# Patient Record
Sex: FEMALE | Race: OTHER | Employment: UNEMPLOYED | URBAN - METROPOLITAN AREA
[De-identification: names, ages, dates, MRNs, and addresses within clinical notes are randomized per-mention and may not be internally consistent; named-entity substitution may affect disease eponyms.]

---

## 2017-05-02 NOTE — PATIENT INSTRUCTIONS
Healthy Diet and Regular Exercise  The [facility] of [address] [place], [place] [address]TRIRIG[employer] for making healthy food choices  -   Enjoy your food, but eat less. Fully enjoy your food when eating. Don’t eat while distracted and slow down. Avoid over sized portions.    Don’

## 2017-05-02 NOTE — PROGRESS NOTES
Physical    Patient's past medical surgical family social history was reviewed. Review of Systems  Allergic: no environmental allergies or food allergies  Cardiovascular: no chest pain, irregular heartbeat, or palpitations.   Constitutional: no fever or

## 2017-06-23 NOTE — PROGRESS NOTES
Patient states that she feels weakness headaches with taking multivitamins after taking them for about anywhere from 5-8 days. She had it when she was pregnant prenatal vitamins she has had recently when she tried taking over-the-counter vitamins.   She al

## 2018-04-23 NOTE — TELEPHONE ENCOUNTER
Action Requested: Summary for Provider     []  Critical Lab, Recommendations Needed  [] Need Additional Advice  []   FYI    []   Need Orders  [] Need Medications Sent to Pharmacy  []  Other      #[id number]    SUMMARY: Patient requesting appt for c/

## 2018-04-24 PROBLEM — J35.8 CRYPTIC TONSIL: Status: ACTIVE | Noted: 2018-04-24

## 2018-04-24 NOTE — PROGRESS NOTES
Patient ID: Raissa Escamilla is a 38year old female.     HPI  Patient presents with:  Sore Throat: X 4 days    Action Requested: Summary for Provider     []  Critical Lab, Recommendations Needed  [] Need Additional Advice  []   FYI    []   Need Orders  [] that is not healing. She states it is sometimes itchy and she picks at it. She cannot remember what it started.   Wt Readings from Last 6 Encounters:  04/24/18 : 160 lb (72.6 kg)  06/23/17 : 150 lb (68 kg)  05/02/17 : 150 lb 6.4 oz (68.2 kg)  12/08/16 : 1 tonsillar at this time. Eyes: Conjunctivae and EOM are normal.   Neck: Neck supple. No thyromegaly present. Cardiovascular: Normal rate, regular rhythm and normal heart sounds.     Pulmonary/Chest: Effort normal and breath sounds normal. No respiratory d DO  4/24/2018

## 2018-04-24 NOTE — PATIENT INSTRUCTIONS
TONSIL STONES (TONSILLITH)    Go ahead and mix half water with half hydrogen peroxide and gargle and spit 2-3 times per week. This will help remove the little tonsil stones that get caught in your tonsils.

## 2018-06-23 NOTE — TELEPHONE ENCOUNTER
Refill Protocol Appointment Criteria  · Appointment scheduled in the past 6 months or in the next 3 months  Recent Outpatient Visits            2 months ago Cryptic tonsil    [address], [address], [place], [place]    Office Visit    1 dutch

## 2018-07-16 NOTE — PROGRESS NOTES
7/16/2018  11:08 AM    Vamsi Brink is a 38year old female. Chief complaint(s): Patient presents with:  Back Pain: pt c/o mid region back pain X 1 month off and on     HPI:     Vamsi Brink primary complaint is regarding back pain.      Marca Goltz Oral Tab Take 1 tablet (550 mg total) by mouth 2 (two) times daily with meals.  Disp: 60 tablet Rfl: 1   Butalbital-APAP-Caffeine -40 MG Oral Tab TAKE 1 TABLET EVERY 4 HOURS AS NEEDED Disp: 30 tablet Rfl: 0       Allergies:  No Known Allergies      RO Prescriptions Disp Refills    Metaxalone (SKELAXIN) 800 MG Oral Tab 30 tablet 1      Sig: Take 1 tablet (800 mg total) by mouth 3 (three) times daily.       Naproxen Sodium (ANAPROX DS) 550 MG Oral Tab 60 tablet 1      Sig: Take 1 tablet (550 mg total) by m

## 2018-08-22 NOTE — PROGRESS NOTES
HPI:    Patient ID: Kristofer Pena is a 38year old female. Patient presents for pain in right hand radiating to right elbow for past few weeks.   Patient has history of injury of right hand and wrist at work about one year ago and had steroid injection strength right hand. Neurological: She is alert and oriented to person, place, and time. No cranial nerve deficit. Coordination normal.   Skin: Skin is warm and dry. No erythema. Psychiatric: She has a normal mood and affect. Vitals reviewed.

## 2018-09-06 NOTE — TELEPHONE ENCOUNTER
Pt called to follow up on message below. Pt asking again for doctor's recommendations for urinary symptoms. Informed pt doctor does not have any appt available today. Pt asking if doctor can prescribe something for her symptoms.      Above information obtai

## 2018-09-06 NOTE — TELEPHONE ENCOUNTER
LMTCB. May transfer to Triage. Noted Aia 16 sent antibiotic to [place]:   Sulfamethoxazole-TMP -160 MG Oral Tab per tablet 10 tablet 0 9/6/2018     Sig - Route:  Take 1 tablet by mouth 2 (two) times daily. - Oral    E-Prescribing Status: Receipt confi

## 2018-09-06 NOTE — TELEPHONE ENCOUNTER
Called with the assistance of language line solutions (#). [phone]   Advised patient that rx was sent to pharmacy, complete entire rx, push fluids, patient agreed to plan

## 2018-09-06 NOTE — TELEPHONE ENCOUNTER
Action Requested: Summary for Provider     []  Critical Lab, Recommendations Needed  [] Need Additional Advice  []   FYI    []   Need Orders  [] Need Medications Sent to Pharmacy  []  Other     SUMMARY:  Pt seeking recommendations for urinary symptoms.

## 2018-10-03 PROBLEM — Z12.31 SCREENING MAMMOGRAM, ENCOUNTER FOR: Status: ACTIVE | Noted: 2018-10-03

## 2018-10-03 NOTE — TELEPHONE ENCOUNTER
----- Message from Noel Obregon MD sent at 10/3/2018 11:28 AM CDT -----  Please inform UA is borderline. Not clearly confirming uti, however, the patient has sx of dysuria.   She can either push fluid/water, take cranberry juice/tablets and await C and

## 2018-10-03 NOTE — PROGRESS NOTES
HPI:    Patient ID: Isatu Cox is a 38year old female. HPI  [facility] visit  C/o recurrent UTI. Past year had 5-6 \"infections\". Received treatment. Several weeks ago, was treated. Felt still some \"pressure\" for 2 weeks afterward.   The last is warm without pallor. Psychiatric: Her behavior is normal. Judgment normal.  Able to communicate verbally. HEENT:  EOMI. JOSEPH. Sclera anicteric. Head: Normocephalic. Normal hair distribution. No lesions. Neck: Normal range of motion.       Ad lesions  Anus-  Normal appearing without lesions. ASSESSMENT/PLAN:   Women's annual routine gynecological examination  (primary encounter diagnosis)  Screening mammogram, encounter for  Pain with urination  Check u/a and c and s.     Normal exam.  Pap

## 2018-10-03 NOTE — TELEPHONE ENCOUNTER
Notes recorded by Charlotte Dutta MD on 10/3/2018 at 2:17 PM CDT  Please inform we just received urine C and S and it was negative.  No UTI.  No need for antibx    Informed pt of Dr. Ena Coronel message above.  Pt upset because she is still having symptoms of

## 2018-10-03 NOTE — TELEPHONE ENCOUNTER
Pt needs a note for work regarding UTI .   States she needs proof from the dr that she has UTI  Bahamian speaker

## 2018-10-04 NOTE — TELEPHONE ENCOUNTER
Pt informed and verbalized understanding. Provided Dr. Julieth Swann  Phone: [phone]. Rx sent to pharmacy on file.

## 2018-10-04 NOTE — TELEPHONE ENCOUNTER
Called pt and no answer. Message left on VM. If pt calls, you may instruct that her tests do not confirm a UTI. Her dysuria may be due to another bladder or urethral issue. We can order her Pyridium 200 mg PO TID prn dysuria #10, gen OK.   She can see a

## 2018-10-05 NOTE — PROGRESS NOTES
REASON FOR VISIT:    Kajal Villa is a 38year old female who presents for an [address].     Fatigue x 1 year  Pos heair loss  No depression   No excessive snoring  No menstral irregularities      Patient Active Problem List:     Female pe MONITORING Internal Lab or Procedure   No disease specific diagnoses    ALLERGIES:   No Known Allergies  CURRENT MEDICATIONS:     Current Outpatient Medications:  Ciprofloxacin HCl 500 MG Oral Tab Take 1 tablet (500 mg total) by mouth 2 (two) times daily. recorded. GENERAL: well developed, well nourished, in no apparent distress  SKIN: no rashes, no suspicious lesions  HEENT: atraumatic, normocephalic, ears and throat are clear  EYES:PERRLA, EOMI, conjunctiva are clear.  normal optic disc  NECK: supple, n on 09/01/2018  Pneumonia: There are no preventive care reminders to display for this patient. HPV: There are no preventive care reminders to display for this patient. Tdap: There are no preventive care reminders to display for this patient.   Shingles:

## 2018-11-21 NOTE — TELEPHONE ENCOUNTER
Pt requesting rx for migraine h/a-started yesterday ,taking tylenol-no relief ,unable to come for Appt  LOV 10/5/18, LR 7/16/18

## 2018-11-21 NOTE — TELEPHONE ENCOUNTER
Pt is requesting a refill on :      Current Outpatient Medications:  Butalbital-APAP-Caffeine -40 MG Oral Tab TAKE 1 TABLET EVERY 4 HOURS AS NEEDED Disp: 30 tablet Rfl: 0

## 2018-11-21 NOTE — TELEPHONE ENCOUNTER
Called in to [address] at [facility]. Pt informed.

## 2018-11-21 NOTE — TELEPHONE ENCOUNTER
Pt stated that she wants to know what medication did we send over. I told her it was Butalbital that was called in. She does not believe this is what she has been on.  I inform her yes if she is in doubt she can ask her pharmacist. She will call us back if

## 2018-12-07 NOTE — MR AVS SNAPSHOT
After Visit Summary   3/28/2024    Alessandra Smith   MRN: [id number]           Visit Information     Date & Time  3/28/2024  1:40 PM Provider  Al Fleming MD [facility] - OB/GYN Dept. Phone  [phone]      Your Vitals Were  Most recent update: 3/28/2024  1:32 PM    BP   128/79 (BP Location: Right arm, Patient Position: Sitting, Cuff Size: large)          Pulse   69          Ht   62\"          Wt   141 lb          LMP   03/14/2024 (Approximate)             BMI   25.79 kg/m²         Allergies as of 3/28/2024  Review status set to Review Complete on 3/28/2024   No Known Allergies     Your Current Medications        Dosage    acetaminophen-codeine 300-30 MG Oral Tab TAKE 1-2 TABLETS BY MOUTH EVERY 4-6 HOURS AS NEEDED FOR SEVERE PAIN    neomycin-polymyxin-dexamethasone 3.5-38471-0.1 Ophthalmic Ointment APLICAR EN AMBOS OJOS AL ACOSTARSE    ofloxacin 0.3 % Ophthalmic Solution INSTILAR 1 GOTA EN EL AMELIA QUIRURGICO CUATRO VECES AL KVNG    Cholecalciferol (VITAMIN D) 50 MCG (2000 UT) Oral Cap Take 2.5 capsules (5,000 Units total) by mouth.    LORazepam (ATIVAN) 0.5 MG Oral Tab Take 1 tablet (0.5 mg total) by mouth 2 (two) times daily as needed for Anxiety.    thyroid (NP THYROID) 30 MG Oral Tab Take 1 tablet (30 mg total) by mouth daily.    ALBUTEROL 108 (90 Base) MCG/ACT Inhalation Aero Soln Inhale 2 puffs into the lungs every 4 (four) hours as needed for Wheezing.    prednisoLONE 1 % Ophthalmic Suspension     guaiFENesin-codeine (CHERATUSSIN AC) 100-10 MG/5ML Oral Solution Take 5 mL by mouth every 6 (six) hours as needed for cough.    Finerenone (KERENDIA) 10 MG Oral Tab Take 10 mg by mouth daily.    JARDIANCE 25 MG Oral Tab TOME 1 TABLETA DIARIAMENTE    Ruxolitinib Phosphate (OPZELURA) 1.5 % External Cream Apply 1 Application topically in the morning and 1 Application before bedtime. For non-segmental vitiligo, unresponsive to topical steroids    [facility]   [address] [place] [address]         Mr. SONAL ARNOLD       Our records show that you are overdue for pacemaker/defibrillator device check. To ensure proper and efficient function of these devices, devices should be checked every 3 months.     Please call our office at [phone] to schedule your device appointment.     Sincerely,     Signatures   Electronically signed by : Umu Castaneda, ; Nov 20 2018  2:47PM CST     previously use on white patches.    clobetasol 0.05 % External Cream Apply 1 Application topically 2 (two) times daily. To white patches. Do not use on the face.    spironolactone 25 MG Oral Tab Take 1 tablet (25 mg total) by mouth daily.    Blood Glucose Monitoring Suppl (CONTOUR NEXT ONE) Does not apply Device CHECK GLUCOSE ONE TIME A DAY AS DIRECTED    butalbital-acetaminophen-caffeine -40 MG Oral Tab TAKE 1 TABLET EVERY 4 HOURS AS NEEDED    ciprofloxacin 500 MG Oral Tab Take 1 tablet (500 mg total) by mouth 2 (two) times daily.    Glucose Blood (CONTOUR NEXT TEST) In Vitro Strip Check glucose one time a day. DX R73.9    Blood Glucose Monitoring Suppl (CONTOUR NEXT MONITOR) w/Device Does not apply Kit 1 Device As Directed. Check glucose one time a day    OneTouch Delica Lancets 33G Does not apply Misc 1 lancet by Finger stick route 2 (two) times a day. DX: R73.9    Blood Glucose Monitoring Suppl (ONETOUCH ULTRA MINI) w/Device Does not apply Kit 1 Device by Other route As Directed. Use as directed. DX: R73.9      Diagnoses for This Visit    Screening for cervical cancer   [097182]  -  Primary  Women's annual routine gynecological examination   [3674163]    Screening mammogram for breast cancer   [8058284]             We Ordered the Following     Normal Orders This Visit    Hpv Dna  High Risk , Thin Prep Collect [TFG6609 CUSTOM]     THINPREP PAP SMEAR ONLY [[id number] CUSTOM]     ThinPrep PAP Smear [OYF8030 CUSTOM]     Future Labs/Procedures Expected by Expires    Hpv Dna  High Risk , Thin Prep Collect [[id number] CUSTOM]  3/28/2024 3/28/2025    [facility] DAVID 2D+3D SCREENING BILAT (CPT=77067/91042) [COMBO CPT(R)]  3/28/2024 (Approximate) 3/28/2025    ThinPrep PAP Smear [[id number] CUSTOM]  3/28/2024 3/28/2025      Future Appointments        Provider Department    4/22/2024 1:40 PM Akshat Estrada    5/15/2024 11:40 AM [address]    3/31/2025 10:00 AM  Al Fleming [facility] - OB/GYN      Imaging Scheduling Instructions     Around March 28, 2024   Imaging:   PAULO DAVID 2D+3D SCREENING BILAT (CPT=77067/52205)    Instructions: To schedule a test at any [facility], call Central Scheduling at [phone], Monday through Friday between 7:30am to 6pm and on Saturday between 8am and 1pm.      [facility] ([address])        [address]    [place], [place]          It is the patient's responsibility to check with and follow their insurance company's guidelines for prior authorization for this test.  You may be held responsible for payment in full if proper authorization is not acquired.  Please contact the Patient Business Office at [phone] if you have   any questions related to insurance coverage.  Thank you.    Children: Children under the age of 12 must have another adult caregiver with them. Please do not bring your child/children without a caregiver. Because of the highly sensitive equipment and privacy of all our patients, children will not be permitted in the exam rooms, unless otherwise noted and in   accordance with departmental policy.                  Did you know that [facility] primary care physicians now offer Video Visits through [employer] for adult patients for a variety of conditions such as allergies, back pain and cold symptoms? Skip the drive and waiting room and online chat with a doctor face-to-face using your web-cam enabled computer or mobile device wherever you are. Video Visits cost $50 and can be paid hassle-free using a credit, debit, or health savings card.  Not active on [employer]? Ask us how to get signed up today!          If you receive a survey from Ludivina Flores, please take a few minutes to complete it and provide feedback. We strive to deliver the best patient experience and are looking for ways to make improvements. Your feedback will  help us do so. For more information on [employer], please visit [email or web address]           No text in SmartText           No text in SmartText

## 2018-12-07 NOTE — PATIENT INSTRUCTIONS
Pratik Moseley M.D.    1.  Urine specimen today for urine culture and complete urinalysis    2.   Standing order for urine culture and complete urinalysis anytime you think you have an infection--go to [facility]

## 2018-12-07 NOTE — PROGRESS NOTES
Tay Armijo is a 37year old female. Reason for Consultation:   Patient presents with:  UTI: PT referred by [facility] and  for recurrent UTI.  PT denies current symptoms, states when she does have infection she has burning and pain Past Surgical History:   Procedure Laterality Date   • BREAST SURGERY Right 16 yrs ago    right breasst cyst   • CHOLECYSTECTOMY     • TUBAL LIGATION        Family History   Problem Relation Age of Onset   • Diabetes Father    • Heart Disorder Mother symptoms  Psychiatric:  Negative for inappropriate interaction and psychiatric symptoms  Respiratory:  Negative for cough, dyspnea and wheezing      PHYSICAL EXAM:   Constitutional: appears well hydrated alert and responsive no acute distress noted  Neurol cipro 500 mg 2x for 5 days    Provided pt instructions to immediately return to laboratory for UA if pt experiences another infection.     Imagin Renal US = Normal renal ultrasound          ASSESSMENT/PLAN:      (N39.0) Recurrent UTI  (primar this test is to make sure that there is nothing wrong with the anatomy of the bladder that might cause you to have more frequent urinary tract infections. Please make sure CT scan is completed before the cystoscopy     5.   Please take diazepam/Valium 5

## 2018-12-20 NOTE — TELEPHONE ENCOUNTER
I called pt with the assistance of Bermudian interpretor Toan Cueto [place] # [id number] and I informed pt that we are trying to schd her cysto poss. Bladder bx with PVK at the [facility] under local but she has not yet completed the CT scan.  I told her that it will need PA wi

## 2018-12-20 NOTE — TELEPHONE ENCOUNTER
I spoke with pt with the assistance of marivel Arias Right ID # [id number] and told pt that we are wanting to move forward with schdg her cysto poss bladder BX at the [facility] under local, but she has not completed her CT scan yet.  I told pt that since this

## 2018-12-21 NOTE — TELEPHONE ENCOUNTER
Pt procedure/Testing: CT abd/pelvis without contrast.  Pt insurance/number to contact: [employer] ID# and group: [id number], [id number]  Dx: recurrent UTI's-N39.0  CPT: 93997  Procedure scheduled date:   Procedure scheduled where:  Pt author

## 2018-12-26 NOTE — TELEPHONE ENCOUNTER
See below. Case NOT approved. Per automated system, letter of denial sent to pt,as well and a PEER to PEER review may be initiated, if you disagree with decision. , please let me know if you wish me to arrange this for you. Thank you.

## 2018-12-27 NOTE — TELEPHONE ENCOUNTER
Pt procedure/Testing US kidneys    Pt insurance/number to contact:[phone]  Insurance ID# and AFNOO:[id number]  Dx:  ALR:[id number]  Indication for test:  Procedure scheduled date:  Procedure scheduled where:  Pt authorization number:NO PRIOR AUTH REQUIRED P

## 2018-12-27 NOTE — TELEPHONE ENCOUNTER
Phoned pt using language line,  Sahil cheung, [place] [id number]. Read to her 's instructions in note as outlined below in this encounter. She verbalized understanding. Informed her staff will need to  attempt to get u.s.  Authorized, and she should call cl

## 2018-12-27 NOTE — TELEPHONE ENCOUNTER
Called patient and notified her that u/s does not require prior auth patient verbalized understanding

## 2018-12-27 NOTE — TELEPHONE ENCOUNTER
Please contact patient and notify ---  Her health insurance plan is denying covering CT scan for workup of recurrent urinary tract infection.   Another medically acceptable approach, for investigating urinary tract infections, would be a kidney ultrasound p

## 2019-01-15 NOTE — TELEPHONE ENCOUNTER
Pt states she has new ins, states she is ready to go forward with treatment PVK recommended, asking what the next step would be. Pls call thank you. Kyrgyz speaker.

## 2019-01-16 NOTE — TELEPHONE ENCOUNTER
Surgery schdg: please see bolded msg below. You may already have this surgery request as pt had her O/V in late Dec. 2018 but did not complete the testing due to her INS expiring and she now has a new INS.      I called pt with the assistance of marivel int

## 2019-01-16 NOTE — TELEPHONE ENCOUNTER
I called pt with the assistance of Cypriot interpretor Briseida Fan # [id number] and I reviewed all of the instructions in PVK's AVS of LOV in Dec 2018 and I also told pt that at that time we call her INS regarding the Kidney US and when we called her to notify t

## 2019-01-17 NOTE — TELEPHONE ENCOUNTER
Pt procedure/Testing: US Kidneys  Pt insurance/number to contact: [phone]  Insurance ID# and group: [id number]  Dx: N13.0, n20.0  CPT: 40349  Indication for test: Hydronephrosis  Procedure scheduled date:  Procedure scheduled where:  Pt authorization

## 2019-01-17 NOTE — TELEPHONE ENCOUNTER
See below. Phoned pt via language line,  vikas, id # [id number]. Informed pt that [facility] is out of network with her new [employer], however the hospital is not out of network.  Pt does not want to proceed any further with testing o

## 2019-01-17 NOTE — TELEPHONE ENCOUNTER
Pt was called today about the PA that was started for the CT scan and was told that [facility] [place] [place] is now out of network for her new [employer] INS. Pt will not be going forward with the testing and cysto at the [address], and will find a new provider.

## 2019-03-11 NOTE — PROGRESS NOTES
Had for 1 moth +  Worse over last week  Hurts to sit   Rt lower thoracic overib  \"I feel a bump there\"    No fall  No cough  No fever  No rash. Has some lower back pain as a result of the pain. Lungs clear  Rt lower rib # 9 tender over e angle.   Ex

## 2019-04-02 NOTE — PROGRESS NOTES
4/2/2019  10:01 AM    Alessandra Faulkner is a 37year old female.     Chief complaint(s): Patient presents with:  Back Pain: burning sensation, unable to sit long periods of time  Diarrhea: loose stools after meals     HPI:     Chapis Grigsby Use      Smoking status: Never Smoker      Smokeless tobacco: Never Used    Alcohol use: No    Drug use: No       Immunizations:     Immunization History  Administered            Date(s) Administered    FLULAVAL 6 months & older 0.5 ml Prefilled syringe (9 are normal. There is no tenderness. There is no CVA tenderness. Musculoskeletal:   Negative for scoliosis  No spinal tenderness    Lymphadenopathy:     She has no cervical adenopathy. Skin: No rash noted.        LABORATORY RESULTS:   No results found fo thoracic back pain    DDx renal stone  MEDICATIONS:   Acetaminophen prn        LABORATORY & ORDERS: Orders Placed This Encounter      POC Urinalysis, Automated Dip without microscopy (PCA and [facility] ONLY) [89161]    REFERRALS: US ABDOMEN COMPLETE (CPT=76700)

## 2019-04-17 NOTE — TELEPHONE ENCOUNTER
Pt would like a call back with her ultra sound results. Pt is requesting return call in [address] [facility]

## 2019-04-17 NOTE — TELEPHONE ENCOUNTER
Patient advised of US notes per Dr Fernando Green and agreed with plan. Follow up appt scheduled for 4/23/19 11:45am with Dr Fernando Green, 1st available appt.     Result Notes for US ABDOMEN COMPLETE  Notes recorded by Meño Lopez MD on 4/13/2019 at 3:44 PM CDT  Results r

## 2019-04-23 NOTE — PROGRESS NOTES
4/23/2019  9:17 AM    Alessandra Zavala is a 37year old female.     Chief complaint(s): Patient presents with:  Imaging: US in abdomen done on 4/13/19 results f/u    HPI:     [facility] primary complaint is regarding abdominal and back p Pancreas obscured by bowel gas. Normal spleen. Normal kidneys, normal aorta. HISTORY:  No past medical history on file.    Past Surgical History:   Procedure Laterality Date   • BREAST SURGERY Right 16 yrs ago    right breasst cyst   • CHOLECYSTECTOMY Head: Normocephalic. Nose: No rhinorrhea. Mouth/Throat: Oropharynx is clear and moist.   Eyes: Conjunctivae are normal.   Neck: Neck supple. Cardiovascular: Normal rate and regular rhythm.     Pulmonary/Chest: Effort normal and breath sounds normal. cholecystectomy state. PANCREAS: Obscured by bowel gas. SPLEEN: Normal.  Normal size and echotexture. Spleen length measures 9.8 cm. KIDNEYS: Normal.  No mass, obstruction or obvious calculus. The right kidney length measures 11.9 cm.   The left kidney le

## 2019-07-10 NOTE — PROGRESS NOTES
7/10/2019  1:00 PM    Alessandra Faulkner is a 37year old female. Chief complaint(s): Patient presents with:  Derm Problem: on both hands x 1 month    HPI:     Chapis Grigsby primary complaint is regarding rash.      Chapis Grigsby Respiratory: Negative for shortness of breath. Cardiovascular: Negative for chest pain and palpitations. Gastrointestinal: Negative for nausea and vomiting. Musculoskeletal: Negative for back pain. Skin: Positive for rash.    Neurological: Forest Dedrick

## 2019-12-05 PROBLEM — B36.0 TINEA VERSICOLOR: Status: ACTIVE | Noted: 2019-12-05

## 2019-12-05 NOTE — PATIENT INSTRUCTIONS
Tinea Versicolor  This is a rash caused by a fungus in the top layers of the skin. This fungus is normally present in the pores of the skin and causes no symptoms. But when the fungus overgrows it causes a rash.  The fungus grows more easily in hot climat · Wear loose clothing. Try to let your skin stay cool and breathe. · Use sunscreen and protect yourself from sunlight  · Avoid tanning beds  Follow-up care  Follow up with your healthcare provider, or as advised.  Call your provider if the rash doesn’t get Some parts of the urinalysis may be done in the provider's office and some in a lab. And, the urine sample may be checked for bacteria and yeast (urine culture). Your healthcare provider will tell you more about these tests if they are needed.   How is dysu

## 2019-12-05 NOTE — TELEPHONE ENCOUNTER
Received a call from [address] from [address] who reports the Selenium Sulfide shampoo is only available in the 2.5% strength. Message routed to provider for review.     Please reply to pool: JUAN CARLOS Patten

## 2019-12-05 NOTE — PROGRESS NOTES
HPI    Patient presents for urinary burning x 3 days. Started with lots of frequency. Took azo which helped but has since stopped because it was not helping. With a lot of pressure in her pelvic area. Also with some spots of white on her skin.   Was t Alcohol use: No      Drug use: No      Sexual activity: Not on file    Lifestyle      Physical activity:        Days per week: Not on file        Minutes per session: Not on file      Stress: Not on file    Relationships      Social connections:        T rales.   Abdominal: Soft. Bowel sounds are normal.   Lymphadenopathy:     She has no cervical adenopathy. Neurological: She is alert and oriented to person, place, and time. Skin: Skin is warm and dry. Psychiatric: She has a normal mood and affect.  H

## 2019-12-11 NOTE — TELEPHONE ENCOUNTER
Patient requesting results for urine culture. Was seen in [facility] 12/5/19 for urinary symptoms, will be completing her antibiotic by today. Continues to have mild burning after urination, denied any other symptoms.  Is wanting to know if she should try another a

## 2020-02-20 NOTE — TELEPHONE ENCOUNTER
Changed to halobetasol   We will see if that goes through otherwise may need to change to betamethasone

## 2020-02-21 NOTE — TELEPHONE ENCOUNTER
Pt's daughter called(pt is Jamaican speaking only)    States waiting on update regarding medication that was sent to pharmacy.  Please call

## 2020-02-22 NOTE — TELEPHONE ENCOUNTER
Please check with the pharmacy if there is any medication that is formulary.   Betamethasone dipropionate cream augmented or not may be the next option not as strong

## 2020-02-24 NOTE — TELEPHONE ENCOUNTER
Pt informed of rx change with no need for PA. Rx pended. Once signed by [facility], please notify pt. Thank you.

## 2020-02-25 NOTE — TELEPHONE ENCOUNTER
Fax from covermymeds    Follow up on pa required for:    Shelley Mccann : [id number]    Placed fax in pa inbox

## 2020-02-25 NOTE — TELEPHONE ENCOUNTER
diprolene--new rx sent. we have changed this several times to find a covered med.    Pa not going through as of yet for protopic either

## 2020-02-25 NOTE — TELEPHONE ENCOUNTER
SIMONE Leong was covered. Patient notified. May close encounter if no further recommendations. I called [place].   Betamethasone Dipropionate IS covered by the patient's insurance.    Copay will be $8.18  They do not have it in stock but

## 2020-03-01 NOTE — PROGRESS NOTES
Elisabeth Camacho is a 53year old female.     Patient presents with:  Derm Problem: new patient, has \"skin discoloration\" on arms, hands, groin area, doesn't know when it started, denies personal or family history of skin cancer, no pacemaker per Procedure Laterality Date   • BREAST SURGERY Right 16 yrs ago    right breasst cyst   • CHOLECYSTECTOMY     • TUBAL LIGATION       Social History    Socioeconomic History      Marital status:       Spouse name: Not on file      Number of children: discoloration\" on arms, hands, groin area, doesn't know when it started, denies personal or family history of skin cancer, no pacemaker per patient. ROS:    Denies any other systemic complaints.   No fevers, chills, night sweats, photosensitivity, lym repigmention discussed. Cosmetic coverups reviewed unpredictable, generally progressive nature  discussed.   Need for control of inflammation, repopulation of skin with melanocytes which is difficult and areas of few follicles, stimulation of pigment produ

## 2020-03-05 NOTE — TELEPHONE ENCOUNTER
Action Requested: Summary for Provider     []  Critical Lab, Recommendations Needed  [] Need Additional Advice  [x]   FYI    []   Need Orders  [] Need Medications Sent to Pharmacy  []  Other     SUMMARY: Patient calling with complaint of urinary burning an

## 2020-03-05 NOTE — PATIENT INSTRUCTIONS
Dysuria     Painful urination (dysuria) is often caused by a problem in the urinary tract. Dysuria is pain felt during urination. It is often described as a burning. Learn more about this problem and how it can be treated. What causes dysuria?   Possib · Rash or joint pain  · Increased back or abdominal pain  · Enlarged painful lymph nodes (lumps) in the groinTh   Date Last Reviewed: 1/1/2017  © 0231-3927 [employer]. [address] [facility] [place], [address] [place] [address]. All rights reserved.  This info

## 2020-03-05 NOTE — PROGRESS NOTES
HPI    Patient presents for urinary frequency and burning x 2 days. With lots of pelvic pain, and chills. Denies fever and flank pain. Requesting refills on migraine medication. Last refill was in 2018.       Review of Systems   Genitourinary: Posit connections:        Talks on phone: Not on file        Gets together: Not on file        Attends Pentecostalism service: Not on file        Active member of club or organization: Not on file        Attends meetings of clubs or organizations: Not on file Cardiovascular: Normal rate, regular rhythm and normal heart sounds. No murmur heard. Pulmonary/Chest: Effort normal and breath sounds normal. No respiratory distress. She has no wheezes. She has no rales. Abdominal: Soft.  Bowel sounds are normal.

## 2020-05-11 NOTE — ED PROVIDER NOTES
Patient Seen in: [facility] Emergency Department    History   Patient presents with:  Chest Pain Angina    Stated Complaint: chest pain    HPI    53year old female presenting with chest pain. Pain began 1 month ago and has been constant .  The pat All other systems reviewed and negative except as noted above. PSFH elements reviewed from today and agreed except as otherwise stated in HPI.     Physical Exam     ED Triage Vitals [05/11/20 1037]   BP (!) 156/96   Pulse 87   Resp 16   Temp 97.6 °F (*)     All other components within normal limits   TROPONIN I - Normal   D-DIMER - Normal   EMH POCT PREGNANCY URINE - Normal   RAPID SARS-COV-2 BY PCR - Normal   CBC WITH DIFFERENTIAL WITH PLATELET    Narrative:      The following orders were created for pretest probability patient for PE, will not pursue further PE work-up. ER return precautions given and she is agreeable to the plan.         Heart Score:    HEART Score      Title    Criteria Score   Age:  41-57 Age Score:  1   History:  Slightly Suspicio

## 2020-05-11 NOTE — TELEPHONE ENCOUNTER
Action Requested: Summary for Provider     []  Critical Lab, Recommendations Needed  [] Need Additional Advice  []   FYI    []   Need Orders  [] Need Medications Sent to Pharmacy  []  Other     SUMMARY: Advised patient to have someone drive her to the ER n

## 2020-05-12 NOTE — TELEPHONE ENCOUNTER
Clinical Impressions         Chest pain, atypical   Disposition         Discharge         ED/IC AVS (Printed 5/11/2020)         Follow-Ups: Follow up with Robby Altman MD (Family Medicine) in 3 days (5/14/2020)

## 2020-05-14 NOTE — PROGRESS NOTES
5/14/2020  10:20 AM      Alessandra Stephenson is a 53year old female.     Chief complaint(s): Patient presents with:  ER F/U: ER F/U chest pain current sx back pain, minor cough    HPI:     Tay Armijo primary complaint is regarding as above ALPRAZolam (XANAX) 0.25 MG Oral Tab Take 1 tablet (0.25 mg total) by mouth every 12 (twelve) hours as needed for Anxiety. 30 tablet 0   • Betamethasone Dipropionate Aug 0.05 % External Cream Apply 1 Application topically 2 (two) times daily.  30 g 1   • But 7 - 18 mg/dL    Creatinine 0.74 0.55 - 1.02 mg/dL    BUN/CREA Ratio 9.5 (L) 10.0 - 20.0    Calcium, Total 9.1 8.5 - 10.1 mg/dL    Calculated Osmolality 288 275 - 295 mOsm/kg    GFR, Non- 97 >=60    GFR, -American 112 >=60   TROPONIN for 1 month. TECHNIQUE:   Single view. FINDINGS:  CARDIAC/VASC: Normal.  No cardiac silhouette abnormality or cardiomegaly. Unremarkable pulmonary vasculature. MEDIAST/ESAU:   No visible mass or adenopathy.  LUNGS/PLEURA: Normal.  No significant pulmon Encounter      Potassium [E]       RECOMMENDATIONS given include: Please, call our office with any questions or concerns. Notify Dr Adrianna Reese or the [facility] if there is a deterioration or worsening of the medical condition.  Also, inform the doctor w

## 2020-08-17 NOTE — TELEPHONE ENCOUNTER
Fax from [address] required for calcipotriene 0.005 % External Cream    KEY: [id number]    Placed in pa inbox

## 2020-08-18 NOTE — TELEPHONE ENCOUNTER
Okay to check with pharmacy to see if the ointment is covered -this looked the same in epic when I sent prescription. If not covered okay to please try PA. For psoriasiform dermatitis and vitiligo.   She is using this along with her other medications-  Ta

## 2020-08-23 NOTE — PROGRESS NOTES
Kesha Gutiérrez is a 53year old female. Patient presents with:  Derm Problem: LOV 2/19/20. pt presenting today with Vitiligo f/u on bilateral wrist, groin and arms. Slight improvement since last visit.  pt currently using tacrolimus with impro Sig Dispense Refill   • Eflornithine HCl (VANIQA) 13.9 % External Cream Use bid 30 g 12   • spironolactone 25 MG Oral Tab Take 1 tablet (25 mg total) by mouth daily.  30 tablet 3   • Betamethasone Valerate 0.1 % External Lotion Use bid to scalp 60 mL 3   • Inability: Not on file      Transportation needs:        Medical: Not on file        Non-medical: Not on file    Tobacco Use      Smoking status: Never Smoker      Smokeless tobacco: Never Used    Substance and Sexual Activity      Alcohol use: No      Chace increased facial hair as well as hair loss. Labs 7 1 TSH okay hormone studies in late 2018 normal.    ROS:    Denies any other systemic complaints. 10 point review of systems was completed. Pertinent positives and negatives noted in the the HPI.     No fev phototherapy reviewed. The fact that this takes at minimal 100 sessions to see significant repigmention discussed. Cosmetic coverups reviewed unpredictable, generally progressive nature  discussed.   Need for control of inflammation, repopulation of skin prn    The patient indicates understanding of these issues and agrees to the plan. The patient is asked to return as noted in follow-up as above. This note was generated using Dragon voice recognition software.   Please contact me regarding any confusio

## 2020-09-04 NOTE — PROGRESS NOTES
HPI:    Patient ID: Joey Lamar is a 53year old year old female. HPI  Well woman visit  Last menstrual period August 12. Menses have been normal and lasting 4 to 5 days and not heavy. She had a normal mammogram in August 10.   She states t Butalbital-APAP-Caffeine -40 MG Oral Tab TAKE 1 TABLET EVERY 4 HOURS AS NEEDED 30 tablet 1   • LORazepam (ATIVAN) 0.5 MG Oral Tab Take 1 tablet (0.5 mg total) by mouth every 6 (six) hours as needed for Anxiety.  30 tablet 1       Physical Exam    Alka every 3 years when previous normal/-HPV. Monthly self breast exam.  Annual screening mammograms. Contraception discussed as needed. Screening colonoscopy at age 46, earlier if indicated. Recommend regular exercise and quality diet.   Return to clinic in total) by mouth every 6 (six) hours as needed for Anxiety. , Disp: 30 tablet, Rfl: 1    No facility-administered encounter medications on file as of 9/4/2020.

## 2020-09-15 NOTE — TELEPHONE ENCOUNTER
Call from [facility] [address] for Betamethasone Dipropionate Aug 0.05 % External Cream. States was prescribed by PCP April Plunkett but is no longer seeing him. Asking for KMT to prescribe medication.  Please call    lov 8/17/2020

## 2020-11-13 NOTE — ED PROVIDER NOTES
Patient Seen in: [facility]      History   Patient presents with:  Chest Pain    Stated Complaint: chest pain,cough    HPI  Patient has had a mild cough for the last several days. She lost her taste and smell 2 days ago.   She now has pain in Physical Exam  Vitals signs and nursing note reviewed. Constitutional:       General: She is not in acute distress. Appearance: She is well-developed. Comments: Well appearing   HENT:      Head: Normocephalic and atraumatic.       Right E emergency department for further evaluation. Patient understands and agrees with the plan. All questions answered prior to discharge. All parts of this patient encounter with me were facilitated by a .            Disposition and Plan

## 2021-02-28 NOTE — PROGRESS NOTES
Chapis Grigsby is a 50year old female. Patient presents with:  Derm Problem: LOV 8/17/2020 Patient present to f/u on vitiligo. Patient was treated  with calcipatriene with some improvement             Patient has no known allergies.   Current Anxiety. 30 tablet 1   • spironolactone 25 MG Oral Tab Take 1 tablet (25 mg total) by mouth daily. 30 tablet 3   • cyclobenzaprine 10 MG Oral Tab Take 1 tablet (10 mg total) by mouth 3 (three) times daily as needed for Muscle spasms.  30 tablet 0   • Naprox Minutes per session: Not on file      Stress: Not on file    Relationships      Social connections        Talks on phone: Not on file        Gets together: Not on file        Attends [employer] service: Not on file        Active member of club or organizati lymph node swelling. No other skin complaints. History, medications, allergies as noted. History con't :   No past history of similar lesions. Started recently over the last few months. Patient presents with concerns above.   Denies any other syste cause more problems with hyperpigmentation in other areas. May proceed with patient wishes to she will check with her insurance.   Will plan to begin phototype 2 per protocol all body with face 2-3 times weekly  Cosmetic coverups reviewed unpredictable, ge removal discussed. Signs and symptoms of skin cancer, ABCDE's of melanoma discussed with patient. Sunscreen use, sun protection, self exams reviewed.     Recheck with MD in 2 to 3 months      Vitiligo  (primary encounter diagnosis)  Dermatitis        T

## 2021-08-28 NOTE — TELEPHONE ENCOUNTER
Spoke with pt informed her of refill approval states that some areas have started to clear up states some improvement has an appt scheduled for 09/29 for darlene/Becky

## 2021-10-11 NOTE — TELEPHONE ENCOUNTER
•  Clobetasol Propionate 0.05 % External Cream, Apply 1 Application topically 2 (two) times daily. , Disp: 60 g, Rfl: 3      KEY: [id number]      •  tacrolimus (PROTOPIC) 0.1 % External Ointment, Use bid, Disp: 100 g, Rfl: 12    KEY: [id number] No

## 2021-10-11 NOTE — PROGRESS NOTES
Shanna Kirkpatrick is a 50year old female. Patient presents with:  Derm Problem: LOV 2/17/21- pt presents for f/u on Vitiligo, pt was treated with Calcipatriene with minor improvements. some areas worse then others            Patient has no known Therapy Pack As directed. 1 kit 0   • LORazepam (ATIVAN) 0.5 MG Oral Tab Take 1 tablet (0.5 mg total) by mouth every 6 (six) hours as needed for Anxiety.  30 tablet 1   • cyclobenzaprine 10 MG Oral Tab Take 1 tablet (10 mg total) by mouth 3 (three) times da anesthetic: No    Social History Narrative      Not on file    Social Determinants of Health  Financial Resource Strain:       Difficulty of Paying Living Expenses: Not on file  Food Insecurity:       Worried About Running Out of Food in the Last Year: Not lesions. Using calcipotriene to vitiligo spots. Vitiligo is most concerning for her. Does have betamethasone cream at home. Notes new areas on the arms and elbows. Not using anything topically to scalp for alopecia.     Patient notes phototherapy kulwinder the volar wrists, dorsal arms, right groin. More lesions over the arms elbows new areas over the ankles medial feet larger areas over the wrists mid back patch at the mid back. Smaller lesions scattered. New areas on the feet ankles knees wrists.   More been slowly progressive worsening recently  Has not used any medications for this not using the betamethasone not taking Aldactone  At present would not recommend any additional medications consider finasteride in future  Continue supportive care with eligio

## 2021-10-26 NOTE — ED PROVIDER NOTES
Patient Seen in: [facility]      History   Patient presents with:  Abdominal Pain    Stated Complaint: belly pain/lower left side    Subjective:   HPI    51 yo female here for evaluation of LLQ abdominal pain.   Pt reports pain started yesterd equal, round, and reactive to light. Cardiovascular:      Rate and Rhythm: Normal rate. Pulmonary:      Effort: Pulmonary effort is normal.   Abdominal:      General: Abdomen is flat. Tenderness:  There is abdominal tenderness in the left lower lizandro MD  [address] [place] [address]  [phone]      call to be seen within 1 week for follow up          Medications Prescribed:  Discharge Medication List as of 10/26/2021  1:27 PM    START taking these medications    ciprofloxacin 500 MG Ora

## 2021-10-26 NOTE — ED INITIAL ASSESSMENT (HPI)
L side abdominal pain that started yesterday. Describes it as shooting pains. Pain is worse with movement, especially bending. Had a normal bowel movement this morning.

## 2022-02-10 NOTE — TELEPHONE ENCOUNTER
Pt informed of rx for Halobetasol sent to [facility] in [place].  used for communication with pt. Pt verbalized understanding. detailed exam

## 2022-08-16 PROBLEM — N63.20 MASS OF LEFT BREAST: Status: ACTIVE | Noted: 2022-08-16

## 2022-08-16 PROBLEM — N64.4 BREAST PAIN, LEFT: Status: ACTIVE | Noted: 2022-08-16

## 2022-08-23 NOTE — TELEPHONE ENCOUNTER
Lithuanian phone line  #[phone] used to translate phone call. Pt called and informed of results and recommendations. Pt voices understanding. Order for mammogram placed.  Pt placed in follow up book

## 2022-10-31 PROBLEM — Z12.31 SCREENING MAMMOGRAM, ENCOUNTER FOR: Status: RESOLVED | Noted: 2018-10-03 | Resolved: 2022-10-31

## 2022-10-31 PROBLEM — B35.6 TINEA CRURIS: Status: ACTIVE | Noted: 2022-10-31

## 2022-11-01 NOTE — TELEPHONE ENCOUNTER
Patient name and  verified. Patient unsure of which medication was ordered. Patient informed of clotrimazole cream prescribed by AJB. Currently in line at pharmacy waiting to  medication. Nothing further.

## 2023-01-01 NOTE — TELEPHONE ENCOUNTER
Please inform patient that her urine culture was sensitive to the antibiotic that she was placed on. She may finish as ordered and continue to drink lots of water.   If she is still symptomatic, we can repeat testing however if she is feeling better there Zach Sorensen  (RN)  2023 01:01:46 Sally Hyde)  2023 12:27:52

## 2023-02-02 NOTE — TELEPHONE ENCOUNTER
Spoke to patient informed she is overdue for six month mammogram and breast ultrasound. Patient verbalized understanding she will call today to schedule an appointment. No further action is required. Order in epic.

## 2023-02-27 RX ORDER — SPIRONOLACTONE 25 MG/1
TABLET ORAL
Qty: 30 TABLET | Refills: 0 | OUTPATIENT
Start: 2023-02-27

## 2023-03-27 PROBLEM — N39.3 SUI (STRESS URINARY INCONTINENCE, FEMALE): Status: ACTIVE | Noted: 2023-03-27

## 2023-03-27 PROBLEM — N60.01 BILATERAL BREAST CYSTS: Status: ACTIVE | Noted: 2023-03-27

## 2023-03-27 PROBLEM — N60.02 BILATERAL BREAST CYSTS: Status: ACTIVE | Noted: 2023-03-27

## 2023-03-27 PROBLEM — Z01.419 ENCOUNTER FOR WELL WOMAN EXAM WITH ROUTINE GYNECOLOGICAL EXAM: Status: ACTIVE | Noted: 2018-10-03

## 2023-10-10 NOTE — PROGRESS NOTES
Christine Marina MD  Department of Urology  [facility]    T: [phone]  F: [phone]    To: Fam Yanes MD   [address] [facility] [address]    Re: Linh Sharp   MRN: [id number]  : 1975    Dear Fam Yanes MD,    Today I had the pleasure of seeing Linh Sharp in my clinic. As you know, Ms. Star Barba is a pleasant 50year old year old female who I am seeing for microhematuria. Patient was last seen in this department on Visit date not found. Briefly, patient had a urine dipstick in 2023 that demonstrated microscopic hematuria. She has not had a true UA with microscopic blood. She is a nonsmoker and has never seen gross hematuria. PAST MEDICAL HISTORY:  Past Medical History:   Diagnosis Date    Migraine headache     Sigmoid diverticulitis         PAST SURGICAL HISTORY:  Past Surgical History:   Procedure Laterality Date    BREAST BIOPSY Right     COLONOSCOPY  2022    LAPAROSCOPIC CHOLECYSTECTOMY      TUBAL LIGATION           ALLERGIES:  No Known Allergies      MEDICATIONS:  Current Outpatient Medications   Medication Instructions    ALBUTEROL 108 (90 Base) MCG/ACT Inhalation Aero Soln 2 puffs, Inhalation, Every 4 hours PRN    Blood Glucose Monitoring Suppl (CONTOUR NEXT MONITOR) w/Device Does not apply Kit 1 Device, Does not apply, As Directed, Check glucose one time a day    Blood Glucose Monitoring Suppl (CONTOUR NEXT ONE) Does not apply Device CHECK GLUCOSE ONE TIME A DAY AS DIRECTED    Blood Glucose Monitoring Suppl (ONETOUCH ULTRA MINI) w/Device Does not apply Kit 1 Device, Other, As Directed, Use as directed. DX: R73.9    butalbital-acetaminophen-caffeine -40 MG Oral Tab TAKE 1 TABLET EVERY 4 HOURS AS NEEDED    ciprofloxacin (CIPRO) 500 mg, Oral, 2 times daily    Glucose Blood (CONTOUR NEXT TEST) In Vitro Strip Check glucose one time a day.  DX R73.9    Glucose Blood (ONETOUCH VERIO) In Vitro Strip DX: R73.9 Use as directed. Jardiance 25 mg, Oral, Daily    LORazepam (ATIVAN) 0.5 mg, Oral, 2 times daily PRN    OneTouch Delica Lancets 60O Does not apply Misc 1 lancet , Finger stick, 2 times daily, DX: R73.9    spironolactone (ALDACTONE) 25 mg, Oral, Daily        FAMILY HISTORY:  Family History   Problem Relation Age of Onset    Diabetes Father     Heart Disorder Mother         CAD    Diabetes Sister         SOCIAL HISTORY:  Social History     Socioeconomic History    Marital status:     Number of children: 3   Occupational History    Occupation: fabric factory maintenance and cleaning   Tobacco Use    Smoking status: Never    Smokeless tobacco: Never   Vaping Use    Vaping Use: Never used   Substance and Sexual Activity    Alcohol use: No    Drug use: No   Other Topics Concern    Reaction to local anesthetic No          PHYSICAL EXAMINATION:  There were no vitals filed for this visit. CONSTITUTIONAL: No apparent distress, cooperative and communicative  NEUROLOGIC: Alert and oriented   HEAD: Normocephalic, atraumatic   EYES: Sclera non-icteric   ENT: Hearing intact, moist mucous membranes   NECK: No obvious goiter or masses   RESPIRATORY: Normal respiratory effort, Nonlabored breathing on room air  SKIN: No evident rashes   ABDOMEN: Soft, nontender, nondistended, no rebound tenderness, no guarding, no masses      REVIEW OF SYSTEMS:    A comprehensive 10-point review of systems was completed. Pertinent positives and negatives are noted in the the HPI.        LABORATORY DATA:      Component  Ref Range & Units 9/19/23 11:13 AM   Glucose Urine  Negative mg/dL >=1000 Abnormal    Bilirubin Urine  Negative Negative   Ketones, UA  Negative - Trace mg/dL Negative   Spec Gravity  1.005 - 1.030 1.020   Blood Urine  Negative Negative   PH Urine  5.0 - 8.0 6.0   Protein Urine  Negative - Trace mg/dL Negative   Urobilinogen Urine  0.2 - 1.0 mg/dL 0.2   Nitrite Urine  Negative Negative Leukocyte Esterase Urine  Negative Negative   APPEARANCE  Clear clear   Color Urine  Yellow light yellow   Multistix Lot#  Numeric [id number]   Multistix Expiration Date  Date 10/30/2024     Glucose Urine  Negative mg/dL 500 Abnormal    Bilirubin Urine  Negative Negative   Ketones, UA  Negative - Trace mg/dL Negative   Spec Gravity  1.005 - 1.030 1.025   Blood Urine  Negative Small Abnormal    PH Urine  5.0 - 8.0 6.0   Protein Urine  Negative - Trace mg/dL 30 Abnormal    Urobilinogen Urine  0.2 - 1.0 mg/dL 0.2   Nitrite Urine  Negative Negative   Leukocyte Esterase Urine  Negative Negative   APPEARANCE  Clear slightly cloudy   Color Urine  Yellow dark yellow   Multistix Lot#  Numeric [id number]   Multistix Expiration Date  Date 10/30/2024           IMAGING REVIEW:  Narrative   PROCEDURE:  KIDNEY/BLADDER (CPT=76770)     COMPARISON: [address] [place] [address] Dr Cervantes,  KIDNEY RENAL, 4/26/2016, 11:28 AM.     INDICATIONS: Microalbuminuria     TECHNIQUE: Ultrasound examination was performed to visualize the kidneys and bladder. FINDINGS:  RIGHT KIDNEY: Normal.  Right kidney length is 12.7 cm. Normal echotexture. No hydronephrosis, mass, calculus or perirenal fluid. LEFT KIDNEY: Normal.  Left kidney length is 11.96 cm. Normal echotexture. No hydronephrosis, mass, calculus or perirenal fluid. BLADDER: Normal.  No visible wall thickening, mass, or calculus. CONCLUSION: Normal renal ultrasound. DICTATED BY (CST): Asa Khalil MD ON 10/05/2023 AT 4:06 PM      FINALIZED BY (CST): Asa Khalil MD ON 10/05/2023 AT 4:07 PM          OTHER RELEVANT DATA:   none     IMPRESSION: blood on urine dipstick without any obvious microhematuria. Recommend repeat UA to evaluate for microhematuria and if present then proceed with cystoscopy. RBUS negative. I discussed hematuria in detail both gross and microscopic.   I mentioned that it can come from anywhere the urine touches in the urinary tract including the kidneys, ureters, urethra, prostate in men, vagina/uterus in women. I mention that there are benign causes like kidney stones, trauma, heavy exercise, idiopathic hematuria as well as more concerning causes like a kidney tumor, ureteral tumor or bladder tumor. PLAN:  UA microscopic  If UA showed microhematuria then cystoscopy     Thank you for referring this very pleasant patient to my clinic. If you have any questions or concerns, please do not hesitate to contact me. Sincerely,  Thelma Hanks MD    30 minutes were spent on this patient at this visit obtaining a history, reviewing medical records, developing a treatment plan, counseling and discussing treatment strategy with patient, coordination of care and documentation. The [facility] makes medical notes available to patients in the interest of transparency. However, please be advised that this is a medical document. It is intended as a peer to peer communication. It is written in medical language and may contain abbreviations or verbiage that are technical and unfamiliar. It may appear blunt or direct. Medical documents are intended to carry relevant information, facts as evident, and the clinical opinion of the practitioner.

## 2023-12-08 NOTE — TELEPHONE ENCOUNTER
Medication PA Requested:   opzelura cream 1.5%                                                       CoverMyMeds Used:  Key:  Quantity:  60gm  Day Supply: 30  Sig: apply BID  DX Code:  L80                                   CPT code (if applicable):   Case Number/Pending Ref#:

## 2023-12-11 NOTE — TELEPHONE ENCOUNTER
Derm Staff,    Progress notes from 12/7, needs to be completed prior to submitting PA for Opzelura. Please notify upon completion.    Thank you

## 2023-12-17 NOTE — PROGRESS NOTES
Med Boone is a 46year old female. Chief Complaint   Patient presents with    Derm Problem     LOV 9/29/21. Patient present for skin irritation on her bilateral forearms for 2 months. Denies any dryness or itching. Notes, the irritation has spread. Has personal Hx of vitiligo. Patient has no known allergies. Current Outpatient Medications   Medication Sig Dispense Refill    Ruxolitinib Phosphate (OPZELURA) 1.5 % External Cream Apply 1 Application topically in the morning and 1 Application before bedtime. For non-segmental vitiligo, unresponsive to topical steroids previously use on white patches. 60 g 2    clobetasol 0.05 % External Cream Apply 1 Application topically 2 (two) times daily. To white patches. Do not use on the face. 60 g 3    spironolactone 25 MG Oral Tab Take 1 tablet (25 mg total) by mouth daily. 30 tablet 2    LORazepam (ATIVAN) 0.5 MG Oral Tab Take 1 tablet (0.5 mg total) by mouth 2 (two) times daily as needed for Anxiety. 45 tablet 2    Empagliflozin (JARDIANCE) 25 MG Oral Tab Take 25 mg by mouth daily. 90 tablet 1    Blood Glucose Monitoring Suppl (CONTOUR NEXT ONE) Does not apply Device CHECK GLUCOSE ONE TIME A DAY AS DIRECTED      butalbital-acetaminophen-caffeine -40 MG Oral Tab TAKE 1 TABLET EVERY 4 HOURS AS NEEDED 30 tablet 1    ciprofloxacin 500 MG Oral Tab Take 1 tablet (500 mg total) by mouth 2 (two) times daily. 10 tablet 0    Glucose Blood (CONTOUR NEXT TEST) In Vitro Strip Check glucose one time a day. DX R73.9 50 strip 2    Blood Glucose Monitoring Suppl (CONTOUR NEXT MONITOR) w/Device Does not apply Kit 1 Device As Directed. Check glucose one time a day 1 kit 0    Glucose Blood (ONETOUCH VERIO) In Vitro Strip DX: R73.9 Use as directed. 100 strip 1    ALBUTEROL 108 (90 Base) MCG/ACT Inhalation Aero Soln Inhale 2 puffs into the lungs every 4 (four) hours as needed for Wheezing.  6.7 g 3    OneTouch Delica Lancets 36F Does not apply Misc 1 lancet by Finger stick route 2 (two) times a day. DX: R73.9 100 each 1    Blood Glucose Monitoring Suppl (ONETOUCH ULTRA MINI) w/Device Does not apply Kit 1 Device by Other route As Directed. Use as directed. DX: R73.9 1 kit 0      Past Medical History:   Diagnosis Date    Migraine headache     Sigmoid diverticulitis 2021      Social History:  Social History     Socioeconomic History    Marital status:     Number of children: 3   Occupational History    Occupation: fabric factory maintenance and cleaning   Tobacco Use    Smoking status: Never    Smokeless tobacco: Never   Vaping Use    Vaping Use: Never used   Substance and Sexual Activity    Alcohol use: No    Drug use: No   Other Topics Concern    Reaction to local anesthetic No                 Current Outpatient Medications   Medication Sig Dispense Refill    Ruxolitinib Phosphate (OPZELURA) 1.5 % External Cream Apply 1 Application topically in the morning and 1 Application before bedtime. For non-segmental vitiligo, unresponsive to topical steroids previously use on white patches. 60 g 2    clobetasol 0.05 % External Cream Apply 1 Application topically 2 (two) times daily. To white patches. Do not use on the face. 60 g 3    spironolactone 25 MG Oral Tab Take 1 tablet (25 mg total) by mouth daily. 30 tablet 2    LORazepam (ATIVAN) 0.5 MG Oral Tab Take 1 tablet (0.5 mg total) by mouth 2 (two) times daily as needed for Anxiety. 45 tablet 2    Empagliflozin (JARDIANCE) 25 MG Oral Tab Take 25 mg by mouth daily. 90 tablet 1    Blood Glucose Monitoring Suppl (CONTOUR NEXT ONE) Does not apply Device CHECK GLUCOSE ONE TIME A DAY AS DIRECTED      butalbital-acetaminophen-caffeine -40 MG Oral Tab TAKE 1 TABLET EVERY 4 HOURS AS NEEDED 30 tablet 1    ciprofloxacin 500 MG Oral Tab Take 1 tablet (500 mg total) by mouth 2 (two) times daily. 10 tablet 0    Glucose Blood (CONTOUR NEXT TEST) In Vitro Strip Check glucose one time a day.  DX R73.9 50 strip 2    Blood Glucose Monitoring Suppl (CONTOUR NEXT MONITOR) w/Device Does not apply Kit 1 Device As Directed. Check glucose one time a day 1 kit 0    Glucose Blood (ONETOUCH VERIO) In Vitro Strip DX: R73.9 Use as directed. 100 strip 1    ALBUTEROL 108 (90 Base) MCG/ACT Inhalation Aero Soln Inhale 2 puffs into the lungs every 4 (four) hours as needed for Wheezing. 6.7 g 3    OneTouch Delica Lancets 67N Does not apply Misc 1 lancet by Finger stick route 2 (two) times a day. DX: R73.9 100 each 1    Blood Glucose Monitoring Suppl (ONETOUCH ULTRA MINI) w/Device Does not apply Kit 1 Device by Other route As Directed. Use as directed.  DX: R73.9 1 kit 0     Allergies:   No Known Allergies    Past Medical History:   Diagnosis Date    Migraine headache     Sigmoid diverticulitis 2021     Past Surgical History:   Procedure Laterality Date    BREAST BIOPSY Right 2005    COLONOSCOPY  05/17/2022    LAPAROSCOPIC CHOLECYSTECTOMY      TUBAL LIGATION       Social History     Socioeconomic History    Marital status:      Spouse name: Not on file    Number of children: 3    Years of education: Not on file    Highest education level: Not on file   Occupational History    Occupation: fabric factory maintenance and cleaning   Tobacco Use    Smoking status: Never    Smokeless tobacco: Never   Vaping Use    Vaping Use: Never used   Substance and Sexual Activity    Alcohol use: No    Drug use: No    Sexual activity: Not on file   Other Topics Concern    Grew up on a farm Not Asked    History of tanning Not Asked    Outdoor occupation Not Asked    Breast feeding Not Asked    Reaction to local anesthetic No   Social History Narrative    Not on file     Social Determinants of Health     Financial Resource Strain: Not on file   Food Insecurity: Not on file   Transportation Needs: Not on file   Physical Activity: Not on file   Stress: Not on file   Social Connections: Not on file   Housing Stability: Not on file     Family History   Problem Relation Age of Onset    Diabetes Father     Heart Disorder Mother         CAD    Diabetes Sister                       HPI :      Chief Complaint   Patient presents with    Derm Problem     LOV 9/29/21. Patient present for skin irritation on her bilateral forearms for 2 months. Denies any dryness or itching. Notes, the irritation has spread. Has personal Hx of vitiligo. Follow-up vitiligo worsening  Not using anything currently previous topicals not helpful also questions regarding increased hair growth on the face  Patient presents with concerns above. Past notes/ records and appropriate/relevant lab results including pathology and past body maps reviewed. Updated and new information noted in current visit. ROS:    Denies any other systemic complaints. No fevers, chills, night sweats, sensitivity to the sun, deeper lumps or bumps. No other skin complaints. History, medications, allergies as noted. Physical examination: Patient  well-developed well-nourished, alert oriented in no acute distress. Exam of involved, appropriate areas of skin performed, including scalp, head, neck, face,nails, hair, external eyes, including conjunctival mucosa, eyelids, lips, external ears, back, chest, abdomen, arms, legs, palms. Remarkable for lesions as noted   Vitiligo patches volar wrists, elbows terminal hairs noted chin, perioral cheeks  No other susupicious lesions on todays  exam.       ASSESSMENT AND PLAN:     Encounter Diagnoses   Name Primary? Vitiligo Yes    Benign neoplasm of skin, unspecified location        Assessment / plan:    Vitiligo nonsegmental  Vitiligo. .  Meds in grid. Skin care instructions reviewed. Pathophysiology reviewed. Consider laboratory tests if appropriate. Possibility of phototherapy reviewed. And areas affected may not be helpful may cause increased dyspigmentation initially. Cosmetic coverups reviewed unpredictable, generally progressive nature  discussed.   Need for control of inflammation, repopulation of skin with melanocytes which is difficult and areas of few follicles, stimulation of pigment production in order to see significant repigmention reviewed. Autoimmune nature discussed. Vitiligo nonsegmental April Stains is the only FDA approved medication for this. Rx sent. She has used topical steroids previously without much improvement. Will resume clobetasol while awaiting authorization for April Stains. May take several months to see repigmentation. Patient will let us know how they are doing over the next several weeks. Await clinical response to above therapy. Meds have been okay    Facial hair, darker terminal hairs per patient  Laser list given [facility] dermatology    No other susupicious lesions on todays  exam.    Pathophysiology discussed with patient. Therapeutic options reviewed. See  Medications in grid. Instructions reviewed at length. General skin care questions answered. Reassurance regarding benign skin lesions. Signs and symptoms of skin cancer, ABCDE's of melanoma briefly reviewed. Sunscreen use, sun protection, encouraged. Followup as noted in rtc or p.r.n. Encounter Times new patient  Including precharting, reviewing chart, prior notes obtaining history: 10 minutes, medical exam :10 minutes, notes on body map, plan, counseling 10minutes My total time spent caring for the patient on the day of the encounter: 30 minutes     The patient indicates understanding of these issues and agrees to the plan. The patient is asked to return as noted in follow-up /as noted above    This note was generated using Dragon voice recognition software. Please contact me regarding any confusion resulting from errors in recognition. Note to patient and family: The [facility] makes medical notes like these available to patients. However, be advised this is a medical document. It is intended as [id number]-gtxy communication and monitoring of a patient's care needs.  It is written in medical language and may contain abbreviations or verbiage that are unfamiliar. It may appear blunt or direct. Medical documents are intended to carry relevant information, facts as evident and the clinical opinion of the practitioner. No orders of the defined types were placed in this encounter. Meds & Refills for this Visit:   Requested Prescriptions     Signed Prescriptions Disp Refills    Ruxolitinib Phosphate (OPZELURA) 1.5 % External Cream 60 g 2     Sig: Apply 1 Application topically in the morning and 1 Application before bedtime. For non-segmental vitiligo, unresponsive to topical steroids previously use on white patches. clobetasol 0.05 % External Cream 60 g 3     Sig: Apply 1 Application topically 2 (two) times daily. To white patches. Do not use on the face. Encounter Diagnoses   Name Primary? Vitiligo Yes    Benign neoplasm of skin, unspecified location        No orders of the defined types were placed in this encounter. Results From Past 48 Hours:  No results found for this or any previous visit (from the past 48 hour(s)). Meds This Visit:      Imaging Orders:  None     Referral Orders:  No orders of the defined types were placed in this encounter.

## 2023-12-19 NOTE — TELEPHONE ENCOUNTER
Attempted to submit PA via CMM several times, system is experiencing issues, will try again later.

## 2024-01-25 NOTE — TELEPHONE ENCOUNTER
Medication PA Requested:   opzelura cream 1.5%                                                       CoverMyMeds Used: Yes  Key: [id number]  Quantity:  60gm  Day Supply: 30  Sig: apply BID  DX Code:  L80                                   CPT code (if applicable):   Case Number/Pending Ref#:       CMM submitted, LOV 12/7  Awaiting determination

## 2024-01-25 NOTE — TELEPHONE ENCOUNTER
Ruxolitinib Phosphate (OPZELURA) 1.5 % External Cream, Apply 1 Application topically in the morning and 1 Application before bedtime. For non-segmental vitiligo, unresponsive to topical steroids previously use on white patches., Disp: 60 g, Rfl: 2    Key: [id number]

## 2024-01-26 NOTE — TELEPHONE ENCOUNTER
Denied-non formulary  Formulary alternatives: Betamethasone valerate cream, halobetasol cream, and tacrolimus.    Please advise, ty.

## 2024-03-28 PROBLEM — Z01.419 WOMEN'S ANNUAL ROUTINE GYNECOLOGICAL EXAMINATION: Status: ACTIVE | Noted: 2024-03-28

## 2024-03-28 PROBLEM — Z01.419 ENCOUNTER FOR WELL WOMAN EXAM WITH ROUTINE GYNECOLOGICAL EXAM: Status: RESOLVED | Noted: 2018-10-03 | Resolved: 2024-03-28

## 2024-03-28 NOTE — PROGRESS NOTES
HPI:    Patient ID: Alessandra Smith is a 48 year old year old female.    HPI  Well woman visit  48-year-old  4 para 3-0-1-3 last menstrual period- 3/14/24    After last year's mammogram on  she was advised short-term follow-up bilateral breast ultrasound in 6 months which she did not do.  CONCLUSION:     1. Benign mammographic findings.  No mammographic evidence for malignancy.  As long as the patient's clinical breast exam remains unchanged, annual screening mammogram is recommended.   2. Multiple bilateral probably benign ultrasound findings as detailed above.  Six-month follow-up bilateral ultrasound recommended for continued surveillance.      BI-RADS CATEGORY:     DIAGNOSTIC CATEGORY 3--PROBABLY BENIGN FINDING.       RECOMMENDATIONS:   SHORT TERM FOLLOW-UP ULTRASOUND BILATERAL BREASTS IN 6 MONTHS.      Her menstrual cycles have been less regular.  She skipped 3 months.  Had mild hot flashes and night sweats and mild impairment of her sleep.  Is better now.  Lorazepam at bedtime helps her with anxiety and sleep.  She has been struggling somewhat after the death of her mother.  Mild stress urinary incontinence is slightly improved from last year.  She did not complete pelvic floor physical therapy  Review of Systems   Constitutional: Negative.    Cardiovascular: Negative.    Gastrointestinal: Negative.    Genitourinary: Negative.    Skin: Negative.    Neurological: Negative.    Psychiatric/Behavioral: Negative.     All other systems reviewed and are negative.       Current Outpatient Medications   Medication Sig Dispense Refill    Cholecalciferol (VITAMIN D) 50 MCG ( UT) Oral Cap Take 5,000 Units by mouth.      LORazepam (ATIVAN) 0.5 MG Oral Tab Take 1 tablet (0.5 mg total) by mouth 2 (two) times daily as needed for Anxiety. 45 tablet 2    guaiFENesin-codeine (CHERATUSSIN AC) 100-10 MG/5ML Oral Solution Take 5 mL by mouth every 6 (six) hours as needed for cough. 240 mL 0    Finerenone  (KERENDIA) 10 MG Oral Tab Take 10 mg by mouth daily. 90 tablet 1    thyroid (NP THYROID) 30 MG Oral Tab Take 1 tablet (30 mg total) by mouth daily. 30 tablet 2    JARDIANCE 25 MG Oral Tab TOME 1 TABLETA DIARIAMENTE 90 tablet 0    Ruxolitinib Phosphate (OPZELURA) 1.5 % External Cream Apply 1 Application topically in the morning and 1 Application before bedtime. For non-segmental vitiligo, unresponsive to topical steroids previously use on white patches. 60 g 2    clobetasol 0.05 % External Cream Apply 1 Application topically 2 (two) times daily. To white patches. Do not use on the face. 60 g 3    spironolactone 25 MG Oral Tab Take 1 tablet (25 mg total) by mouth daily. 30 tablet 2    Blood Glucose Monitoring Suppl (CONTOUR NEXT ONE) Does not apply Device CHECK GLUCOSE ONE TIME A DAY AS DIRECTED      butalbital-acetaminophen-caffeine -40 MG Oral Tab TAKE 1 TABLET EVERY 4 HOURS AS NEEDED 30 tablet 1    ciprofloxacin 500 MG Oral Tab Take 1 tablet (500 mg total) by mouth 2 (two) times daily. 10 tablet 0    Glucose Blood (CONTOUR NEXT TEST) In Vitro Strip Check glucose one time a day. DX R73.9 50 strip 2    Blood Glucose Monitoring Suppl (CONTOUR NEXT MONITOR) w/Device Does not apply Kit 1 Device As Directed. Check glucose one time a day 1 kit 0    ALBUTEROL 108 (90 Base) MCG/ACT Inhalation Aero Soln Inhale 2 puffs into the lungs every 4 (four) hours as needed for Wheezing. 6.7 g 3    OneTouch Delica Lancets 33G Does not apply Misc 1 lancet by Finger stick route 2 (two) times a day. DX: R73.9 100 each 1    Blood Glucose Monitoring Suppl (ONETOUCH ULTRA MINI) w/Device Does not apply Kit 1 Device by Other route As Directed. Use as directed. DX: R73.9 1 kit 0       Past Medical History:   Diagnosis Date    Migraine headache     Sigmoid diverticulitis 2021       Past Surgical History:   Procedure Laterality Date    BREAST BIOPSY Right 2005    COLONOSCOPY  05/17/2022    LAPAROSCOPIC CHOLECYSTECTOMY      TUBAL LIGATION          Family History   Problem Relation Age of Onset    Diabetes Father     Heart Disorder Mother         CAD    Diabetes Sister        Social History     Socioeconomic History    Marital status:      Spouse name: Not on file    Number of children: 3    Years of education: Not on file    Highest education level: Not on file   Occupational History    Occupation: fabric factory maintenance and cleaning   Tobacco Use    Smoking status: Never    Smokeless tobacco: Never   Vaping Use    Vaping Use: Never used   Substance and Sexual Activity    Alcohol use: No    Drug use: No    Sexual activity: Not on file   Other Topics Concern    Grew up on a farm Not Asked    History of tanning Not Asked    Outdoor occupation Not Asked    Breast feeding Not Asked    Reaction to local anesthetic No   Social History Narrative    Not on file     Social Determinants of Health     Financial Resource Strain: Not on file   Food Insecurity: Not on file   Transportation Needs: Not on file   Physical Activity: Not on file   Stress: Not on file   Social Connections: Not on file   Housing Stability: Not on file       Physical Exam     Vitals: LMP 01/23/2024 (Approximate)     Constitutional: She appears well-developed and well-nourished.     Musculoskeletal: Normal range of motion of upper and lower extremities.   Neurological: She is alert and oriented x 3.   Skin: Skin is warm without pallor.  Psychiatric: Her behavior is normal. Judgment normal.  Able to communicate verbally.    HEENT:  EOMI.  JOSEPH.  Sclera anicteric.    Head: Normocephalic.  Normal hair distribution.  No lesions.  Neck: Normal range of motion.      Adenopathy:  No supraclavicular or cervical adenopathy.  Thyroid:  Normal size, shape, and position.  No masses, tenderness, or nodules.  Cardiovascular: Normal rate and regular rhythm.    Pulmonary/Chest: Effort normal.   Abdominal: Soft. Normal appearance and bowel sounds are normal. She exhibits no mass. There is no  hepatosplenomegaly. There is no tenderness. There is no rebound and no CVA tenderness. No hernia. Hernia negative in the ventral area,  negative in the right inguinal area and negative in the left inguinal area.   Lymphadenopathy:        Right: No inguinal adenopathy present.        Left: No inguinal adenopathy present.     Breasts:    Symmetric bilaterally.  Areolas without lesions.  Skin- normal without growths, lesions, erythema or peau d'orange change.  Nipples- without retraction or discharge.  No masses, lumps, skin changes, erythema, or lesions.  Axilla-  No adenopathy, mass, or tenderness.    Genitourinary:   Pelvic exam was performed with patient supine and chaperone present.  External genitalia- normal.  [place]'s and [place]'s glands normal.  Urethral meatus- without lesions, mass, or discharge.  Urethra- normal without lesion, cyst, mass, or tenderness.  Vulva- normal.  Labia majora and minora without lesions.   Vagina- normal, no lesions or discharge.  Moist and well supported.  Bladder-  nontender.  No masses.  Normal support.  No evidence of cystocele,  Mild abnormal bladder neck mobility Cervix- smooth, normal epithelium without lesions or discharge.  No motion tenderness.   Uterus- normal size, shape, and contour.  Nontender.  No masses.  Adnexa-  Nontender, no masses.   Perineum- normal without lesions  Anus-  Normal appearing without lesions.    ASSESSMENT/PLAN:      ICD-10-CM    1. Screening for cervical cancer  Z12.4 ThinPrep PAP Smear     Hpv Dna  High Risk , Thin Prep Collect      2. Women's annual routine gynecological examination  Z01.419 ThinPrep PAP Smear      3. Screening mammogram for breast cancer  Z12.31 [facility] DAVID 2D+3D SCREENING BILAT (CPT=77067/03351)      Normal exam.  Pap test every 3 years if prior normal/-HPV.  Monthly self breast exam.  Annual screening mammograms.  Recommend screening colonoscopy at age 50, or as advised by GI.  Recommend dexascan for osteoporosis screening as  indicated.  Recommend regular exercise and quality diet.  Return to clinic in one year or as needed.      Orders Placed This Encounter    Hpv Dna  High Risk , Thin Prep Collect     Standing Status:   Future     Standing Expiration Date:   3/28/2025     Order Specific Question:   HPV Genotyping Request (if HPV positive):     Answer:   Yes [1]     Order Specific Question:   Release to patient     Answer:   Immediate       Outpatient Encounter Medications as of 3/28/2024   Medication Sig Dispense Refill    Cholecalciferol (VITAMIN D) 50 MCG (2000 UT) Oral Cap Take 5,000 Units by mouth.      LORazepam (ATIVAN) 0.5 MG Oral Tab Take 1 tablet (0.5 mg total) by mouth 2 (two) times daily as needed for Anxiety. 45 tablet 2    guaiFENesin-codeine (CHERATUSSIN AC) 100-10 MG/5ML Oral Solution Take 5 mL by mouth every 6 (six) hours as needed for cough. 240 mL 0    Finerenone (KERENDIA) 10 MG Oral Tab Take 10 mg by mouth daily. 90 tablet 1    thyroid (NP THYROID) 30 MG Oral Tab Take 1 tablet (30 mg total) by mouth daily. 30 tablet 2    JARDIANCE 25 MG Oral Tab TOME 1 TABLETA DIARIAMENTE 90 tablet 0    Ruxolitinib Phosphate (OPZELURA) 1.5 % External Cream Apply 1 Application topically in the morning and 1 Application before bedtime. For non-segmental vitiligo, unresponsive to topical steroids previously use on white patches. 60 g 2    clobetasol 0.05 % External Cream Apply 1 Application topically 2 (two) times daily. To white patches. Do not use on the face. 60 g 3    spironolactone 25 MG Oral Tab Take 1 tablet (25 mg total) by mouth daily. 30 tablet 2    Blood Glucose Monitoring Suppl (CONTOUR NEXT ONE) Does not apply Device CHECK GLUCOSE ONE TIME A DAY AS DIRECTED      butalbital-acetaminophen-caffeine -40 MG Oral Tab TAKE 1 TABLET EVERY 4 HOURS AS NEEDED 30 tablet 1    ciprofloxacin 500 MG Oral Tab Take 1 tablet (500 mg total) by mouth 2 (two) times daily. 10 tablet 0    Glucose Blood (CONTOUR NEXT TEST) In Vitro Strip  Check glucose one time a day. DX R73.9 50 strip 2    Blood Glucose Monitoring Suppl (CONTOUR NEXT MONITOR) w/Device Does not apply Kit 1 Device As Directed. Check glucose one time a day 1 kit 0    ALBUTEROL 108 (90 Base) MCG/ACT Inhalation Aero Soln Inhale 2 puffs into the lungs every 4 (four) hours as needed for Wheezing. 6.7 g 3    OneTouch Delica Lancets 33G Does not apply Misc 1 lancet by Finger stick route 2 (two) times a day. DX: R73.9 100 each 1    Blood Glucose Monitoring Suppl (ONETOUCH ULTRA MINI) w/Device Does not apply Kit 1 Device by Other route As Directed. Use as directed. DX: R73.9 1 kit 0     No facility-administered encounter medications on file as of 3/28/2024.

## 2024-10-21 ENCOUNTER — TELEPHONE (OUTPATIENT)
Dept: OBGYN CLINIC | Facility: CLINIC | Age: 49
End: 2024-10-21

## 2024-10-21 DIAGNOSIS — R92.30 DENSE BREAST TISSUE: Primary | ICD-10-CM

## 2024-10-21 NOTE — TELEPHONE ENCOUNTER
Patient is requesting to get an order entered right away to get diagnostic mammogram with ultrasound. Patient states that she will be losing her insurance in 2 weeks.

## 2025-04-01 NOTE — PROGRESS NOTES
Alessandra Smith is a 49 year old female  Patient's last menstrual period was 2025 (approximate).   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  Just a little worried about job security    Lives with  and kids, feels safe  No vasomotor symptoms    Still having periods, has been skipping cycles    Colonoscopy 2022, told to repeat in 10yrs    OBSTETRICS HISTORY:     OB History    Para Term  AB Living   4 3     1 3   SAB IAB Ectopic Multiple Live Births   1       3      # Outcome Date GA Lbr Vasquez/2nd Weight Sex Type Anes PTL Lv   4 SAB            3 Para      NORMAL SPONT   SELVIN   2 Para      NORMAL SPONT   SELVIN   1 Para      NORMAL SPONT   SELVIN       GYNE HISTORY:     Hx Prior Abnormal Pap: No   Period Cycle (Days): Irregular (2025  1:45 PM)  Period Duration (Days): 3-6 days (2025  1:45 PM)  Period Flow: light (2025  1:45 PM)  Use of Birth Control (if yes, specify type): None (2025  1:45 PM)  Hx Prior Abnormal Pap: No (2025  1:45 PM)        Latest Ref Rng & Units 3/28/2024     2:25 PM 3/27/2023    11:51 AM 3/1/2022     1:00 PM 2020    10:21 AM 10/2/2018     5:02 PM   RECENT PAP RESULTS   INTERPRETATION/RESULT: Negative for intraepithelial lesion or malignancy Negative for intraepithelial lesion or malignancy  Negative for intraepithelial lesion or malignancy  --  --  Negative for intraepithelial lesion or malignancy    HPV Negative Negative  Negative    Negative          MEDICAL HISTORY:     Past Medical History:    Migraine headache    Sigmoid diverticulitis       SURGICAL HISTORY:     Past Surgical History:   Procedure Laterality Date    Breast biopsy Right     Colonoscopy  2022    Laparoscopic cholecystectomy      Nilson localization wire 1 site right  (cpt=19281)      [place]    Tubal ligation         SOCIAL HISTORY:     Social History     Socioeconomic History    Marital status:     Number of children: 3   Occupational History    Occupation: fabric factory maintenance and cleaning   Tobacco Use    Smoking status: Never     Passive exposure: Never    Smokeless tobacco: Never   Vaping Use    Vaping status: Never Used   Substance and Sexual Activity    Alcohol use: Yes     Comment: social    Drug use: No   Other Topics Concern    Reaction to local anesthetic No     Social Drivers of Health     Food Insecurity: No Food Insecurity (4/1/2025)    NCSS - Food Insecurity     Worried About Running Out of Food in the Last Year: No     Ran Out of Food in the Last Year: No   Transportation Needs: No Transportation Needs (4/1/2025)    NCSS - Transportation     Lack of Transportation: No   Housing Stability: Not At Risk (4/1/2025)    NCSS - Housing/Utilities     Has Housing: Yes     Worried About Losing Housing: No     Unable to Get Utilities: No        FAMILY HISTORY:     Family History   Problem Relation Age of Onset    Diabetes Father     Heart Disorder Mother         CAD    Diabetes Sister        MEDICATIONS:       Current Outpatient Medications:     Glucose Blood (ONETOUCH VERIO) In Vitro Strip, CHECK GLUCOSE ONE TIME A DAY, Disp: 50 strip, Rfl: 2    LORazepam (ATIVAN) 0.5 MG Oral Tab, Take 1 tablet (0.5 mg total) by mouth 2 (two) times daily as needed for Anxiety., Disp: 45 tablet, Rfl: 2    Empagliflozin (JARDIANCE) 25 MG Oral Tab, Take 1 tablet by mouth daily., Disp: 90 tablet, Rfl: 1    Cholecalciferol (VITAMIN D) 50 MCG (2000 UT) Oral Cap, Take 2.5 capsules (5,000 Units total) by mouth., Disp: , Rfl:     OneTouch Delica Lancets 33G Does not apply Misc, 1 lancet by Finger stick route 2 (two) times a day. DX: R73.9, Disp: 100 each, Rfl: 1    albuterol 108 (90 Base) MCG/ACT Inhalation Aero Soln, Inhale 2 puffs into the lungs every 4 (four) hours  as needed for Wheezing. (Patient not taking: Reported on 4/1/2025), Disp: 6.7 g, Rfl: 2    ibuprofen 600 MG Oral Tab, Take 1 tablet (600 mg total) by mouth every 6 (six) hours as needed for Pain. (Patient not taking: Reported on 4/1/2025), Disp: 30 tablet, Rfl: 1    venlafaxine ER (EFFEXOR XR) 150 MG Oral Capsule SR 24 Hr, Take 1 capsule (150 mg total) by mouth daily. (Patient not taking: Reported on 4/1/2025), Disp: 30 capsule, Rfl: 1    acetaminophen-codeine 300-30 MG Oral Tab, TAKE 1-2 TABLETS BY MOUTH EVERY 4-6 HOURS AS NEEDED FOR SEVERE PAIN (Patient not taking: Reported on 4/1/2025), Disp: , Rfl:     ALLERGIES:     Allergies[1]      REVIEW OF SYSTEMS:     Constitutional:    denies fever / chills  Eyes:     denies blurred or double vision  Cardiovascular:  denies chest pain or palpitations  Respiratory:    denies shortness of breath  Gastrointestinal:  denies severe abdominal pain, frequent diarrhea or constipation, nausea / vomiting  Genitourinary:    denies dysuria, bothersome incontinence  Skin/Breast:   denies any breast pain, lumps, or discharge  Neurological:    denies frequent severe headaches  Psychiatric:   denies depression or anxiety, thoughts of harming self or others  Heme/Lymph:    denies easy bruising or bleeding      PHYSICAL EXAM:   Blood pressure (!) 140/96, weight 143 lb (64.9 kg), last menstrual period 03/19/2025, not currently breastfeeding.  Constitutional:  well developed, well nourished  Head/Face:  normocephalic  Neck/Thyroid: thyroid symmetric, no thyromegaly, no nodules, no adenopathy  Lymphatic: no abnormal supraclavicular or axillary adenopathy is noted  Respiratory:      chest wall symmetric and nontender on palpation, clear to asculation bilateral, no wheezing, rales, ronchi, and resonance normal upon percussion  Cardiovascular: chest normal in appearance, regular rate and rhythm, no murmurs, PMI palpated midclavicular line  Breast:   normal without palpable masses, tenderness,  asymmetry, nipple discharge, nipple retraction or skin changes  Abdomen:   soft, nontender, nondistended, no masses  Skin/Hair:  no unusual rashes or bruises  Extremities:  no edema, no cyanosis, non tender bilaterally  Psychiatric:   oriented to time, place, person and situation. Appropriate mood and affect    Pelvic Exam:  GYNE/: External Genitalia: Normal appearing, no lesions. Urethral meatus appear wnl, no abnormal discharge or lesions noted.          Bladder: well supported, urethra wnl, no lesions or fissures                     Vagina: normal pink mucosa, no lesions, normal clear discharge.                      Uterus: anteverted, mobile, non tender, normal size                     Cervix: Normal,                     Adnexa: non tender, no masses, normal size  Anus: no hemorroids         ASSESSMENT & PLAN:     Alessandra was seen today for annual.    Diagnoses and all orders for this visit:    Encounter for screening mammogram for breast cancer  -     [facility] DAVID 2D+3D SCREENING BILAT (CPT=77067/50599); Future    Anxiety  -     [facility] Navigator    JOURDAN (stress urinary incontinence, female)  -     Urogynecology Referral - In Network    Normal exam.  Pap smear UTD, due 2027.   Recommend repeat pap smear with co-testing every 3 years for normal/ -HPV.  Recommend annual screening mammograms.   Recommend screening colonoscopy starting at 45  Recommend DEXA scan for osteoporosis as indicated.  Discussed importance of incorporating frequent weight bearing exercises and supplemental Vitamin D  Maintain healthy lifestyle with well-balance diet and daily exercise.  Return to clinic in one year or as needed.    C/o JOURDAN, did some physical therapy but had bad experience  May consider pessary vs MUS, will let us know if she desires referral    Anxiety/depression: will place referral to [facility]      FOLLOW-UP     No follow-ups on file.      Bhavani Calzada MD  4/1/2025       [1] No Known Allergies

## 2025-04-06 NOTE — TELEPHONE ENCOUNTER
Yosi,    El equipo de [facility] ha intentado comunicarse con usted en relación con un pedido del consultorio del Dr. Calzada. Nos comunicamos con usted para ayudarlo a coordinar la atención y los recursos que puedan satisfacer alexus necesidades. Llame a nuestro consultorio al [phone]. Para recibir asistencia más inmediata, puede comunicarse con nuestra línea de ayuda disponible las 24 horas al [phone]. Esperamos tener noticias suyas pronto.

## 2025-04-07 NOTE — TELEPHONE ENCOUNTER
[facility]  [address] [place], [place], [address]  [phone]  [email or web address]    [facility]  [address] [facility] [address]  Phone: [phone]  [email or web address]    [facility]  [address] [place] [address], [address]  Phone: [phone]  [email or web address]    [facility], [address], [address]  [place], [address]  Phone:[phone]   [email or web address]

## 2025-04-08 NOTE — TELEPHONE ENCOUNTER
HCC coding opportunities       Chart reviewed, no opportunity found: CHART REVIEWED, NO OPPORTUNITY FOUND   [employer] AUDIT     Patients Insurance     Medicare Insurance: [employer] Medicare Advantage           LOV 8/17/20 please advise

## 2025-06-12 NOTE — PROGRESS NOTES
Alessandra Smith is a 49 year old female.    Chief Complaint   Patient presents with    Throat Problem     Patient is here for little bump sensation on the throat reports difficult to swallow sometimes x 1 month.       HISTORY OF PRESENT ILLNESS  She presents with a 1 month history of sensing something in the back of her throat.  Feels like she swallows but things do not pass through but they actually do.  Denies reflux she has been having some nasal drip and wipes her nose away and does complain of throat clearing and associated cough.  Allergies?  No other new signs, symptoms no real voice changes.  Sent by Dr. Geiger for my opinion regarding her throat symptoms      Social Hx on file[1]    Family History[2]    Past Medical History[3]    Past Surgical History[4]      REVIEW OF SYSTEMS    System Neg/Pos Details   Constitutional Negative Fatigue, fever and weight loss.   ENMT Negative Drooling.   Eyes Negative Blurred vision and vision changes.   Respiratory Negative Dyspnea and wheezing.   Cardio Negative Chest pain, irregular heartbeat/palpitations and syncope.   GI Negative Abdominal pain and diarrhea.   Endocrine Negative Cold intolerance and heat intolerance.   Neuro Negative Tremors.   Psych Negative Anxiety and depression.   Integumentary Negative Frequent skin infections, pigment change and rash.   Hema/Lymph Negative Easy bleeding and easy bruising.           PHYSICAL EXAM    LMP 03/19/2025 (Approximate)        Constitutional Normal Overall appearance - Normal.   Psychiatric Normal Orientation - Oriented to time, place, person & situation. Appropriate mood and affect.   Neck Exam Normal Inspection - Normal. Palpation - Normal. Parotid gland - Normal. Thyroid gland - Normal.   Eyes Normal Conjunctiva - Right: Normal, Left: Normal. Pupil - Right: Normal, Left: Normal. Fundus - Right: Normal, Left: Normal.   Neurological Normal Memory - Normal. Cranial nerves - Cranial nerves II through XII grossly  intact.   Head/Face Normal Facial features - Normal. Eyebrows - Normal. Skull - Normal.        Nasopharynx Normal External nose - Normal. Lips/teeth/gums - Normal. Tonsils - Normal. Oropharynx - Normal.   Ears Normal Inspection - Right: Normal, Left: Normal. Canal - Right: Normal, Left: Normal. TM - Right: Normal, Left: Normal.   Skin Normal Inspection - Normal.        Lymph Detail Normal Submental. Submandibular. Anterior cervical. Posterior cervical. Supraclavicular.        Nose/Mouth/Throat Normal External nose - Normal. Lips/teeth/gums - Normal. Tonsils - Normal. Oropharynx -postnasal discharge with erythema   Nose/Mouth/Throat Normal Nares - Right: Normal Left: Normal. Septum -Normal  Turbinates - Right: Normal, Left: Normal.  Nasal mucosa-very congested   Procedures:  Endoscopy/Laryngoscopy  Pre-Procedure Care: Verbal consent was obtained. Procedure/risks were explained. Questions were answered. Correct patient identified. Correct side and site confirmed.      A topical spray of ).25% Neosynephrine was sprayed into the nose.    Laryngoscopy:  Flexible Fiberoptic Laryngoscopy: A diagnostic flexible fiberoptic laryngoscopy was performed. The flexible fiberoptic laryngoscope was placed into the nose or mouthand advanced  into the interior of the larynx. A thorough examination of the interior of the larynx was performed.   Findings were as follows.       Hypopharynx/Larynx:  Epiglottis is normal.  Arytenoids:  Bilateral: Arytenoids are normal.  Vocal folds-false  Bilateral: Vocal folds (false) are normal.  Vocal folds-true  Bilateral: Vocal folds (true) are normal.  Pyriform sinus:  Bilateral: Pyriform sinuses are normal.  Base of tongue is normal in appearance.  There is no airway obstruction.   General comments: Nasal structures including the epiglottis arytenoids and introitus of the esophagus.  No lesions masses polyps nodules or any other abnormalities.  Significant postnasal discharge  noted            Medications - Current[5]  ASSESSMENT AND PLAN    1. Throat pain  Throat pain most likely related to GERD and/or postnasal discharge.  Very erythematous larynx epiglottis and introitus of the esophagus.  Start Singulair loratadine fluticasone as well as Sudafed and omeprazole for GERD and return to see me in 1 month for reevaluation.  - LARYNGOSCOPY,FLEX FIBER,DIAGNOSTIC        This note was prepared using Dragon Medical voice recognition dictation software. As a result errors may occur. When identified these errors have been corrected. While every attempt is made to correct errors during dictation discrepancies may still exist    Bernardo Berrios MD    6/12/2025    3:58 PM         [1]   Social History  Socioeconomic History    Marital status:     Number of children: 3   Occupational History    Occupation: fabric factory maintenance and cleaning   Tobacco Use    Smoking status: Never     Passive exposure: Never    Smokeless tobacco: Never   Vaping Use    Vaping status: Never Used   Substance and Sexual Activity    Alcohol use: Yes     Comment: social    Drug use: No   Other Topics Concern    Reaction to local anesthetic No   [2]   Family History  Problem Relation Age of Onset    Diabetes Father     Heart Disorder Mother         CAD    Diabetes Sister    [3]   Past Medical History:   Migraine headache    Sigmoid diverticulitis   [4]   Past Surgical History:  Procedure Laterality Date    Breast biopsy Right 2005    Colonoscopy  05/17/2022    Laparoscopic cholecystectomy      Nilson localization wire 1 site right (cpt=19281)      [place]    Tubal ligation     [5]   Current Outpatient Medications:     Glucose Blood (ONETOUCH VERIO) In Vitro Strip, CHECK GLUCOSE ONE TIME A DAY, Disp: 50 strip, Rfl: 2    OneTouch Delica Lancets 33G Does not apply Misc, 1 Lancet by Finger stick route in the morning and 1 Lancet before bedtime. DX: R73.9., Disp: 200 each, Rfl: 3    empagliflozin  (JARDIANCE) 25 MG Oral Tab, Take 1 tablet (25 mg total) by mouth daily., Disp: 90 tablet, Rfl: 1    Tretinoin 0.1 % External Cream, Apply 1 Application topically nightly., Disp: 90 g, Rfl: 1    traZODone 50 MG Oral Tab, Take 0.5 tablets (25 mg total) by mouth nightly., Disp: 30 tablet, Rfl: 2    Cholecalciferol (VITAMIN D) 50 MCG (2000 UT) Oral Cap, Take 2.5 capsules (5,000 Units total) by mouth., Disp: , Rfl:

## 2025-06-19 ENCOUNTER — HOSPITAL ENCOUNTER (EMERGENCY)
Age: 50
Discharge: HOME | End: 2025-06-19
Attending: EMERGENCY MEDICINE

## 2025-06-19 ENCOUNTER — APPOINTMENT (OUTPATIENT)
Dept: CT IMAGING | Age: 50
End: 2025-06-19
Attending: STUDENT IN AN ORGANIZED HEALTH CARE EDUCATION/TRAINING PROGRAM

## 2025-06-19 ENCOUNTER — APPOINTMENT (OUTPATIENT)
Dept: GENERAL RADIOLOGY | Age: 50
End: 2025-06-19
Attending: STUDENT IN AN ORGANIZED HEALTH CARE EDUCATION/TRAINING PROGRAM

## 2025-06-19 VITALS
OXYGEN SATURATION: 98 % | HEART RATE: 72 BPM | SYSTOLIC BLOOD PRESSURE: 129 MMHG | DIASTOLIC BLOOD PRESSURE: 97 MMHG | RESPIRATION RATE: 14 BRPM | WEIGHT: 150.13 LBS | TEMPERATURE: 96.8 F

## 2025-06-19 DIAGNOSIS — R42 DIZZY: ICD-10-CM

## 2025-06-19 DIAGNOSIS — R51.9 ACUTE NONINTRACTABLE HEADACHE, UNSPECIFIED HEADACHE TYPE: ICD-10-CM

## 2025-06-19 DIAGNOSIS — I10 HYPERTENSION, UNSPECIFIED TYPE: Primary | ICD-10-CM

## 2025-06-19 LAB
ALBUMIN SERPL-MCNC: 4.3 G/DL (ref 3.4–5)
ALBUMIN/GLOB SERPL: 1.2 {RATIO} (ref 1–2.4)
ALP SERPL-CCNC: 92 UNITS/L (ref 45–117)
ALT SERPL-CCNC: 27 UNITS/L
ANION GAP SERPL CALC-SCNC: 15 MMOL/L (ref 7–19)
APPEARANCE UR: CLEAR
AST SERPL-CCNC: 25 UNITS/L
ATRIAL RATE (BPM): 74
BASOPHILS # BLD: 0.1 K/MCL (ref 0–0.3)
BASOPHILS NFR BLD: 1 %
BILIRUB SERPL-MCNC: 0.6 MG/DL (ref 0.2–1)
BILIRUB UR QL STRIP: NEGATIVE
BUN SERPL-MCNC: 9 MG/DL (ref 6–20)
BUN/CREAT SERPL: 19 (ref 7–25)
CALCIUM SERPL-MCNC: 9.1 MG/DL (ref 8.4–10.2)
CHLORIDE SERPL-SCNC: 103 MMOL/L (ref 97–110)
CO2 SERPL-SCNC: 26 MMOL/L (ref 21–32)
COLOR UR: COLORLESS
CREAT SERPL-MCNC: 0.48 MG/DL (ref 0.51–0.95)
DEPRECATED RDW RBC: 44.6 FL (ref 39–50)
EGFRCR SERPLBLD CKD-EPI 2021: >90 ML/MIN/{1.73_M2}
EOSINOPHIL # BLD: 0.2 K/MCL (ref 0–0.5)
EOSINOPHIL NFR BLD: 3 %
ERYTHROCYTE [DISTWIDTH] IN BLOOD: 13.2 % (ref 11–15)
FASTING DURATION TIME PATIENT: ABNORMAL H
GLOBULIN SER-MCNC: 3.6 G/DL (ref 2–4)
GLUCOSE SERPL-MCNC: 151 MG/DL (ref 70–99)
GLUCOSE UR STRIP-MCNC: 150 MG/DL
HCT VFR BLD CALC: 44.9 % (ref 36–46.5)
HGB BLD-MCNC: 15 G/DL (ref 12–15.5)
HGB UR QL STRIP: NEGATIVE
IMM GRANULOCYTES # BLD AUTO: 0 K/MCL (ref 0–0.2)
IMM GRANULOCYTES # BLD: 0 %
KETONES UR STRIP-MCNC: NEGATIVE MG/DL
LEUKOCYTE ESTERASE UR QL STRIP: NEGATIVE
LYMPHOCYTES # BLD: 1.7 K/MCL (ref 1–4)
LYMPHOCYTES NFR BLD: 29 %
MCH RBC QN AUTO: 30.5 PG (ref 26–34)
MCHC RBC AUTO-ENTMCNC: 33.4 G/DL (ref 32–36.5)
MCV RBC AUTO: 91.3 FL (ref 78–100)
MONOCYTES # BLD: 0.4 K/MCL (ref 0.3–0.9)
MONOCYTES NFR BLD: 7 %
NEUTROPHILS # BLD: 3.5 K/MCL (ref 1.8–7.7)
NEUTROPHILS NFR BLD: 60 %
NITRITE UR QL STRIP: NEGATIVE
NRBC BLD MANUAL-RTO: 0 /100 WBC
P AXIS (DEGREES): 27
PH UR STRIP: 6.5 [PH] (ref 5–7)
PLATELET # BLD AUTO: 209 K/MCL (ref 140–450)
POTASSIUM SERPL-SCNC: 3.6 MMOL/L (ref 3.4–5.1)
PR-INTERVAL (MSEC): 148
PROT SERPL-MCNC: 7.9 G/DL (ref 6.4–8.2)
PROT UR STRIP-MCNC: NEGATIVE MG/DL
QRS-INTERVAL (MSEC): 106
QT-INTERVAL (MSEC): 410
QTC: 455
R AXIS (DEGREES): -20
RAINBOW EXTRA TUBES HOLD SPECIMEN: NORMAL
RAINBOW EXTRA TUBES HOLD SPECIMEN: NORMAL
RBC # BLD: 4.92 MIL/MCL (ref 4–5.2)
REPORT TEXT: NORMAL
SODIUM SERPL-SCNC: 140 MMOL/L (ref 135–145)
SP GR UR STRIP: <1.005 (ref 1–1.03)
T AXIS (DEGREES): 22
TROPONIN I SERPL DL<=0.01 NG/ML-MCNC: <4 NG/L
UROBILINOGEN UR STRIP-MCNC: 0.2 MG/DL
VENTRICULAR RATE EKG/MIN (BPM): 74
WBC # BLD: 5.9 K/MCL (ref 4.2–11)

## 2025-06-19 PROCEDURE — 81003 URINALYSIS AUTO W/O SCOPE: CPT | Performed by: STUDENT IN AN ORGANIZED HEALTH CARE EDUCATION/TRAINING PROGRAM

## 2025-06-19 PROCEDURE — 10002803 HB RX 637: Performed by: STUDENT IN AN ORGANIZED HEALTH CARE EDUCATION/TRAINING PROGRAM

## 2025-06-19 PROCEDURE — 85025 COMPLETE CBC W/AUTO DIFF WBC: CPT | Performed by: STUDENT IN AN ORGANIZED HEALTH CARE EDUCATION/TRAINING PROGRAM

## 2025-06-19 PROCEDURE — 71045 X-RAY EXAM CHEST 1 VIEW: CPT

## 2025-06-19 PROCEDURE — 99284 EMERGENCY DEPT VISIT MOD MDM: CPT | Performed by: EMERGENCY MEDICINE

## 2025-06-19 PROCEDURE — 70450 CT HEAD/BRAIN W/O DYE: CPT

## 2025-06-19 PROCEDURE — 84484 ASSAY OF TROPONIN QUANT: CPT | Performed by: STUDENT IN AN ORGANIZED HEALTH CARE EDUCATION/TRAINING PROGRAM

## 2025-06-19 PROCEDURE — 93005 ELECTROCARDIOGRAM TRACING: CPT | Performed by: STUDENT IN AN ORGANIZED HEALTH CARE EDUCATION/TRAINING PROGRAM

## 2025-06-19 PROCEDURE — 99285 EMERGENCY DEPT VISIT HI MDM: CPT | Performed by: EMERGENCY MEDICINE

## 2025-06-19 PROCEDURE — 80053 COMPREHEN METABOLIC PANEL: CPT | Performed by: STUDENT IN AN ORGANIZED HEALTH CARE EDUCATION/TRAINING PROGRAM

## 2025-06-19 PROCEDURE — 93010 ELECTROCARDIOGRAM REPORT: CPT | Performed by: INTERNAL MEDICINE

## 2025-06-19 RX ORDER — ONDANSETRON 4 MG/1
4 TABLET, ORALLY DISINTEGRATING ORAL ONCE
Status: COMPLETED | OUTPATIENT
Start: 2025-06-19 | End: 2025-06-19

## 2025-06-19 RX ORDER — AMLODIPINE BESYLATE 5 MG/1
5 TABLET ORAL ONCE
Status: COMPLETED | OUTPATIENT
Start: 2025-06-19 | End: 2025-06-19

## 2025-06-19 RX ORDER — AMLODIPINE BESYLATE 5 MG/1
5 TABLET ORAL DAILY
Qty: 30 TABLET | Refills: 1 | Status: SHIPPED | OUTPATIENT
Start: 2025-06-19 | End: 2025-06-19

## 2025-06-19 RX ORDER — ACETAMINOPHEN 325 MG/1
975 TABLET ORAL ONCE
Status: COMPLETED | OUTPATIENT
Start: 2025-06-19 | End: 2025-06-19

## 2025-06-19 RX ORDER — AMLODIPINE BESYLATE 5 MG/1
2.5 TABLET ORAL DAILY
Qty: 15 TABLET | Refills: 1 | Status: SHIPPED | OUTPATIENT
Start: 2025-06-19

## 2025-06-19 RX ADMIN — ACETAMINOPHEN 975 MG: 325 TABLET ORAL at 10:39

## 2025-06-19 RX ADMIN — ONDANSETRON 4 MG: 4 TABLET, ORALLY DISINTEGRATING ORAL at 10:40

## 2025-06-19 RX ADMIN — AMLODIPINE BESYLATE 5 MG: 5 TABLET ORAL at 11:28

## 2025-06-19 ASSESSMENT — PAIN SCALES - GENERAL
PAINLEVEL_OUTOF10: 4
PAINLEVEL_OUTOF10: 8

## 2025-06-26 NOTE — TELEPHONE ENCOUNTER
With [facility]  Abdelrahman#[id number] .      Stated she  visited the ED a week ago for high blood pressure readings in the 200's and was instructed to schedule a follow-up appointment.   Her current PCP is Dr. Geiger, but she stated he cannot see her today.  She is planning to change her PCP .   She contacted her insurance and they provided STEVE Vidal, but she prefers a physician instead .       Reason for Call/Symptoms: minimal dizziness and minimal  intermittent headaches, ER follow up  ( 1 week ago )    Onset: 1 week     Courtesy Assessment: on blood pressure medications, asymptomatic at this time. Happens mainly in the morning only , headaches -right side temple above the ear     Level of care recommendation: Appt in Office - Same Day    Advice given to patient:    Avoid salty foods.   Continue taking her blood pressure medication and record.    No available appointment today, .  Requested to be seen on Monday.   RN offered to transfer the call for appointment assistance but declined.   RN instructed the patient to go to urgent care instead.

## 2025-07-24 NOTE — PROGRESS NOTES
Alessandra Smith is a 49 year old female.    Chief Complaint   Patient presents with    Follow - Up     Patient here for throat pain f/up, reports she stop taking medication that was prescribed.    Thyroid Nodule     Patient here for thyroid nodule consult, pt had US thyroid on 07/02/2025.       HISTORY OF PRESENT ILLNESS  She presents with a 1 month history of sensing something in the back of her throat.  Feels like she swallows but things do not pass through but they actually do.  Denies reflux she has been having some nasal drip and wipes her nose away and does complain of throat clearing and associated cough.  Allergies?  No other new signs, symptoms no real voice changes.  Sent by Dr. Geiger for my opinion regarding her throat symptoms     7/22/25 last visit laryngoscopy revealed erythema of the laryngeal mucosa but no lesions masses polyps nodules or other abnormalities.  She was started on Singulair loratadine fluticasone as well as Sudafed and omeprazole and she states that she had no improvement at all in her sensations of food getting caught in the back of her throat.  She feels that when she eats that food does not necessarily pass normally through her esophagus and feels like it gets caught at the level of her voicebox.  She did undergo a thyroid ultrasound demonstrating 2 subcentimeter nodules with no other abnormalities.  She returns for further evaluation and management.      Social Hx on file[1]    Family History[2]    Past Medical History[3]    Past Surgical History[4]      REVIEW OF SYSTEMS    System Neg/Pos Details   Constitutional Negative Fatigue, fever and weight loss.   ENMT Negative Drooling.   Eyes Negative Blurred vision and vision changes.   Respiratory Negative Dyspnea and wheezing.   Cardio Negative Chest pain, irregular heartbeat/palpitations and syncope.   GI Negative Abdominal pain and diarrhea.   Endocrine Negative Cold intolerance and heat intolerance.   Neuro Negative  Tremors.   Psych Negative Anxiety and depression.   Integumentary Negative Frequent skin infections, pigment change and rash.   Hema/Lymph Negative Easy bleeding and easy bruising.           PHYSICAL EXAM    Ht 5' 2\" (1.575 m)   Wt 142 lb (64.4 kg)   LMP 03/19/2025 (Approximate)   BMI 25.97 kg/m²        Constitutional Normal Overall appearance - Normal.   Psychiatric Normal Orientation - Oriented to time, place, person & situation. Appropriate mood and affect.   Neck Exam Normal Inspection - Normal. Palpation - Normal. Parotid gland - Normal. Thyroid gland - Normal.   Eyes Normal Conjunctiva - Right: Normal, Left: Normal. Pupil - Right: Normal, Left: Normal. Fundus - Right: Normal, Left: Normal.   Neurological Normal Memory - Normal. Cranial nerves - Cranial nerves II through XII grossly intact.   Head/Face Normal Facial features - Normal. Eyebrows - Normal. Skull - Normal.        Nasopharynx Normal External nose - Normal. Lips/teeth/gums - Normal. Tonsils - Normal. Oropharynx - Normal.   Ears Normal Inspection - Right: Normal, Left: Normal. Canal - Right: Normal, Left: Normal. TM - Right: Normal, Left: Normal.   Skin Normal Inspection - Normal.        Lymph Detail Normal Submental. Submandibular. Anterior cervical. Posterior cervical. Supraclavicular.        Nose/Mouth/Throat Normal External nose - Normal. Lips/teeth/gums - Normal. Tonsils - Normal. Oropharynx - Normal.   Nose/Mouth/Throat Normal Nares - Right: Normal Left: Normal. Septum -Normal  Turbinates - Right: Normal, Left: Normal.   Procedures:  Endoscopy/Laryngoscopy  Pre-Procedure Care: Verbal consent was obtained. Procedure/risks were explained. Questions were answered. Correct patient identified. Correct side and site confirmed.      A topical spray of ).25% Neosynephrine was sprayed into the nose.    Laryngoscopy:  Flexible Fiberoptic Laryngoscopy: A diagnostic flexible fiberoptic laryngoscopy was performed. The flexible fiberoptic laryngoscope was  placed into the nose or mouthand advanced  into the interior of the larynx. A thorough examination of the interior of the larynx was performed.   Findings were as follows.       Hypopharynx/Larynx:  Epiglottis is normal.  Arytenoids:  Bilateral: Arytenoids are normal.  Vocal folds-false  Bilateral: Vocal folds (false) are normal.  Vocal folds-true  Bilateral: Vocal folds (true) are normal.  Pyriform sinus:  Bilateral: Pyriform sinuses are normal.  Base of tongue is normal in appearance.  There is no airway obstruction.   General comments: No significant abnormalities noted other than some postnasal discharge.  No obstructing lesions of the introitus of the esophagus.  No abnormalities in motion of the vocal cords bilaterally.            Medications - Current[5]  ASSESSMENT AND PLAN    1. Dysphagia, unspecified type  Unclear as to what she may be experiencing but it sounds like it is occurring at the level of the upper esophagus.  Could be a cricopharyngeal spasm less likely a Zenker's diverticulum.  Food seems to get stuck in her throat when she is eating.  No help at all from all of the medications I prescribed at her last visit.  She only continues to use omeprazole but states that she really was not having any issues with reflux in the past.  I did recommend her to undergo an esophagram to rule out any type of esophageal abnormality.  Return to see me after her study.  - XR UGI/ESOPHAGUS DOUBLE CONTRAST (CPT=74246); Future        This note was prepared using Dragon Medical voice recognition dictation software. As a result errors may occur. When identified these errors have been corrected. While every attempt is made to correct errors during dictation discrepancies may still exist    Bernardo Berrios MD    7/23/2025    10:02 PM         [1]   Social History  Socioeconomic History    Marital status:     Number of children: 3   Occupational History    Occupation: fabric factory maintenance and cleaning    Tobacco Use    Smoking status: Never     Passive exposure: Never    Smokeless tobacco: Never   Vaping Use    Vaping status: Never Used   Substance and Sexual Activity    Alcohol use: Yes     Comment: social    Drug use: No   Other Topics Concern    Reaction to local anesthetic No   [2]   Family History  Problem Relation Age of Onset    Diabetes Father     Heart Disorder Mother         CAD    Diabetes Sister    [3]   Past Medical History:   Migraine headache    Sigmoid diverticulitis   [4]   Past Surgical History:  Procedure Laterality Date    Breast biopsy Right 2005    Colonoscopy  05/17/2022    Laparoscopic cholecystectomy      Nilson localization wire 1 site right (cpt=19281)      early [place]    Tubal ligation     [5]   Current Outpatient Medications:     loratadine 10 MG Oral Tab, Take 1 tablet (10 mg total) by mouth daily., Disp: , Rfl:     pantoprazole 40 MG Oral Tab EC, Take 1 tablet (40 mg total) by mouth every morning before breakfast., Disp: 30 tablet, Rfl: 0    estradiol 0.1 MG/GM Vaginal Cream, Place 1 g vaginally twice a week., Disp: 42.5 g, Rfl: 2    nitrofurantoin monohydrate macro 100 MG Oral Cap, Take 1 capsule (100 mg total) by mouth daily as needed (take after sexual activity)., Disp: 30 capsule, Rfl: 0    amLODIPine 5 MG Oral Tab, Take 1 tablet (5 mg total) by mouth daily., Disp: 30 tablet, Rfl: 2    Glucose Blood (ONETOUCH VERIO) In Vitro Strip, CHECK GLUCOSE ONE TIME A DAY, Disp: 50 strip, Rfl: 2    OneTouch Delica Lancets 33G Does not apply Misc, 1 Lancet by Finger stick route in the morning and 1 Lancet before bedtime. DX: R73.9., Disp: 200 each, Rfl: 3    empagliflozin (JARDIANCE) 25 MG Oral Tab, Take 1 tablet (25 mg total) by mouth daily., Disp: 90 tablet, Rfl: 1    Tretinoin 0.1 % External Cream, Apply 1 Application topically nightly., Disp: 90 g, Rfl: 1    Cholecalciferol (VITAMIN D) 50 MCG (2000 UT) Oral Cap, Take 2.5 capsules (5,000 Units total) by mouth., Disp: , Rfl:      fluticasone propionate 50 MCG/ACT Nasal Suspension, 1 spray by Nasal route 2 (two) times daily. (Patient not taking: Reported on 7/22/2025), Disp: 16 g, Rfl: 3    pseudoephedrine  MG Oral Tablet 12 Hr, Take 1 tablet (120 mg total) by mouth every 12 (twelve) hours. (Patient not taking: Reported on 7/22/2025), Disp: 60 tablet, Rfl: 3

## 2025-08-07 ENCOUNTER — HOSPITAL ENCOUNTER (OUTPATIENT)
Dept: GENERAL RADIOLOGY | Facility: HOSPITAL | Age: 50
Discharge: HOME | End: 2025-08-07
Attending: OTOLARYNGOLOGY

## 2025-08-07 DIAGNOSIS — R13.10 DYSPHAGIA, UNSPECIFIED TYPE: ICD-10-CM

## 2025-08-07 PROCEDURE — 74246 X-RAY XM UPR GI TRC 2CNTRST: CPT | Performed by: OTOLARYNGOLOGY

## 2025-08-09 ENCOUNTER — RESULTS FOLLOW-UP (OUTPATIENT)
Dept: OTOLARYNGOLOGY | Facility: CLINIC | Age: 50
End: 2025-08-09

## (undated) NOTE — LETTER
No referring provider defined for this encounter.        12/07/18        Patient: Maxine Jones   YOB: 1975   Date of Visit: 12/7/2018       Dear  Dr. Yulissa Olson, DO,      Thank you for referring Maxine Jones 4. CYSTOSCOPY WITH POSSIBLE BLADDER BIOPSY UNDER LOCAL ANESTHESIA AT SURGERY CENTER   (  WITH Valium/diazepam)--     '[[id number] we are not putting you to sleep, please eat breakfast and lunch that day; we will be using local topical anesthesia.   Purpose of

## (undated) NOTE — LETTER
08/31/22        Patient: Melinda Curtis   YOB: 1975   Date of Visit: 8/29/2022       Dear  Dr. Anneliese Joiner MD,      Thank you for referring Melinda Curtis to my practice. Please find my assessment and plan below. Breast pain  (primary encounter diagnosis)  Multiple cysts of breast    48year old female with a probable resolving L subareolar breast cyst, probably perimenopausal, benign rare nipple D/C and likely benign imaging findings. Discussed in detail with patient and options reviewed with . Plan 6 month imaging - order in place, then office visit. See me for recurrent symptomatic cyst - pain and swelling, to consider aspiration. Average breast cancer risk. Consider whole breast US or MRI in the future.                 Sincerely,   MD [facility]  [facility] [address]    Document electronically generated by:  Se Louie MD

## (undated) NOTE — MR AVS SNAPSHOT
[address]  [address], [address]  [address] [facility]  [phone]               Thank you for choosing us for your health care visit with Hood Weston. DO [place].   We are glad to serve you and happy to provide you with this wright [facility]    It is the patient's responsibility to check with and follow their insurance company's guidelines for prior authorization for this test.  You may be held responsible for payment in full if Don’t forget strength training with weights and resistance Set goals and track your progress   You don’t need to join a gym. Home exercises work great.  Put more priority on exercise in your life                Allergies as of May 02, 2017     No Known Al

## (undated) NOTE — MR AVS SNAPSHOT
[address]  [address], [address]  [address] [facility]  [phone]               Thank you for choosing us for your health care visit with Maria De Jesus Rodríguez. DO [place].   We are glad to serve you and happy to provide you with this wright Visit [email or web address] to learn more. Educational Information     Healthy Diet and Regular Exercise  [employer] for making healthy food choices  -   Enjoy your food, but eat less.   Fully enjoy your food when ea

## (undated) NOTE — LETTER
10/4/2018              [address]         Dear Prabha Betts,    It was a pleasure to see you. Your pap and Hpv cotesting was normal.  There is no need for further testing at this time.   I look forward

## (undated) NOTE — LETTER
12/9/2018              Donato Carey        [id number] [facility] [address]         Dear Timothy Rodriguezer,    12/7/culture showed no obvious infection and urinalysis urine test was normal.  Please continue urology plans as last discusse

## (undated) NOTE — LETTER
[facility] [place] - OB/GYN  [address]  [address] [address]  PH: [phone]  FAX: [phone]        [address]  Frederick Fried, :  1975  [id number] [facility] [address]    Yosi Aparicio    Esta carta es para recordarle que programe wright seguimiento de 6 meses para wright ultrasonido de los [employer].  Puede llamar [phone] para programar wright indira

## (undated) NOTE — LETTER
Leslie Gay, Aprbonita  [address] [facility] [address]  [place] [address]       06/12/25        Patient: Alessandra Smith   YOB: 1975   Date of Visit: 6/12/2025       Dear  Dr. Juanjo MD,      Thank you for referring Alessandra Smith to my practice.  Please find my assessment and plan below.    ASSESSMENT AND PLAN    1. Throat pain  Throat pain most likely related to GERD and/or postnasal discharge.  Very erythematous larynx epiglottis and introitus of the esophagus.  Start Singulair loratadine fluticasone as well as Sudafed and omeprazole for GERD and return to see me in 1 month for reevaluation.  - LARYNGOSCOPY,FLEX FIBER,DIAGNOSTIC                 Sincerely,   Bernardo Berrios MD   [facility]  [id number]  [facility] [address]    Document electronically generated by:  Bernardo Berrios MD

## (undated) NOTE — LETTER
7/16/2018              Corinne Serrano        [id number] [facility] [address]         To Whom it may concern:     This is to certify that Corinne Serrano had an appointment on 7/16/2018 at 11:27 AM with Jay Wright MD.        Sincerely,

## (undated) NOTE — LETTER
9/14/2020              Ascencion Casas        [address] [facility]         Dear Courtney Garza,    It was a pleasure to see you. Your PAP test was normal with normal HPV cotesting.   There is no need for further testing at this t

## (undated) NOTE — LETTER
3/4/2022              Melania Lugo        [address] [place] [address] [address]         Dear Justin Jacobs,    It was a pleasure to see you. Your PAP test was normal.  There is no need for further testing at this time. I look forward to seeing you at your next scheduled appointment.       Sincerely,    Debbi Peoples MD  [address] [place]  [address] [place] [address]  [phone]

## (undated) NOTE — LETTER
8/22/2018              Yamilex Alicia        [id number] [facility] [address]         To whom it may concern,    Yasmeen Shah was seen in the office today for pain, numbness, and weakness in right hand and arm.   These symptoms are exacerbated